# Patient Record
Sex: MALE | Race: WHITE | NOT HISPANIC OR LATINO | Employment: FULL TIME | URBAN - METROPOLITAN AREA
[De-identification: names, ages, dates, MRNs, and addresses within clinical notes are randomized per-mention and may not be internally consistent; named-entity substitution may affect disease eponyms.]

---

## 2019-09-09 ENCOUNTER — EVALUATION (OUTPATIENT)
Dept: OCCUPATIONAL THERAPY | Facility: CLINIC | Age: 29
End: 2019-09-09
Payer: COMMERCIAL

## 2019-09-09 DIAGNOSIS — S63.512D SPRAIN OF CARPAL JOINT OF LEFT WRIST, SUBSEQUENT ENCOUNTER: Primary | ICD-10-CM

## 2019-09-09 PROCEDURE — 97140 MANUAL THERAPY 1/> REGIONS: CPT

## 2019-09-09 PROCEDURE — 97165 OT EVAL LOW COMPLEX 30 MIN: CPT

## 2019-09-09 NOTE — PROGRESS NOTES
OT Evaluation     Today's date: 2019  Patient name: Nii Burt  : 1990  MRN: [id number]  Referring provider: Earl Roblero  Dx:   Encounter Diagnosis     ICD-10-CM    1  Sprain of carpal joint of left wrist, subsequent encounter S63 512D                   Assessment  Assessment details: Completed initial evaluation  See report for details  Mark Hernandez a 34 y o  male who presents with sprain of carpal joint of his left wrist  He was injured about 25 month 2nd to a snowboarding accident; however patient was unable to have surgery until 2019; however he had prior Occupational Therapy  Patient wore a cast until 9/3  No weight bearing at this time  Patient presents with well healed 4cm dorsal wrist scar  PMHx includes:  Medications: See list in chart  Patient lives with his significant other  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[employer]  Some of his interests include : snowboarding, playing [employer]   FUNCTIONAL SUMMARY:   Nii Burt is independent in basic self care skills  Patient has difficulty with lifting, cooking, cleaning and work tasks  FOTO score is 35*% with a 69% liimit in function  Nii Burt presents with:  Decreased ROM in the  left wrist,   Decreased strength in the  left in the upper extremity,    No reports of sensory problems,   Increase pain rated at 3-7/10 level   Difficulty with ADLs and work tasks  Patient would benefit from Occupational Therapy to address these impairments in order to return to His prior level of function  Understanding of Dx/Px/POC: good   Prognosis: good    Goals  Short Term Goals:   to be completed in 2-4 weeks     Improve scar mobility through scar massage, Graston techniques and /or kinesiotape ,   Increase ROM of left wwrist to WNLs, through the use of heat modalities, manual therapy with Graston techniques, PROM, joint mobilizations, AROM exercises, flexion taping and or splinting as needed ,   Increase U/E/ wrist to 5/5 and hand strength left =75% of unaffected side  Decrease pain to 0-3/10, through the use of heat/cold modalities, manual therapy, kinesiotape and exercise    Long Term Goals: To be completed in 4-6 weeks    Ability to perform lifting, carrying, cooking,cleaning tasks and work tasks with minimal to no discomfort,   Patient demonstrates good carryover of HEP,   Minimal to no pain complaints  0-2/10,   Full ROM of left wrist  Improved U/E / wrist strength to 5/5 and hand strength  left =75% of unaffected side   :      Plan  Patient would benefit from: skilled occupational therapy  Planned modality interventions: thermotherapy: hydrocollator packs and ultrasound  Planned therapy interventions: joint mobilization, manual therapy, massage, strengthening, stretching, flexibility, functional ROM exercises, home exercise program, therapeutic exercise and therapeutic activities  Frequency: 2x week  Duration in weeks: 12  Plan of Care beginning date: 2019  Plan of Care expiration date: 2019  Treatment plan discussed with: patient        Subjective Evaluation    Pain  Current pain ratin  At best pain rating: 3  At worst pain ratin    Social Support  Lives in: [place]  Lives with: significant other    Employment status: working (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 [employer])  Hand dominance: right    Patient Goals  Patient goals for therapy: increased motion, increased strength, return to sport/leisure activities, return to work and decreased pain  Patient goal: strength and mobility        Objective     Active Range of Motion     Left Wrist   Wrist flexion: 30 degrees with pain  Wrist extension: 5 degrees with pain  Radial deviation: 10 degrees with pain  Ulnar deviation: 15 degrees with pain      Right Wrist   Wrist flexion: 90 degrees   Wrist extension: 45 degrees   Radial deviation: 25 degrees   Ulnar deviation: 45 degrees     Strength/Myotome Testing     Left Wrist/Hand      (2nd hand position)     Trial 1: 30    Thumb Strength  Key/Lateral Pinch     Trail 1: 16  Tip/Two-Point Pinch     Trial 1: 10  Palmar/Three-Point Pinch     Trial 1: 10    Right Wrist/Hand      (2nd hand position)     Trial 1: 125    Thumb Strength   Key/Lateral Pinch     Trial 1: 23  Tip/Two-Point Pinch     Trial 1: 24  Palmar/Three-Point Pinch     Trial 1: 27      Flowsheet Rows      Most Recent Value   PT/OT G-Codes   Current Score  58   Projected Score  0               Subjective: Patient reports pain level as 4/10 today  Objective: Completed initial evaluation  See report for details  Completed Hot pack for warm up, manual therapy, with graston technique, exercises and activities to increase ROM, strength, coordination and function  See Treatment Diary below  Home Program:See Media Section for details  AROM wrist exercises  Precautions: Weight bearing restriction    Assessment: Patient tolerated session well  Plan: Continue Occupational Therapy ~2x/week to decrease pain and edema and improve ROM, strength and function

## 2019-09-09 NOTE — LETTER
September 10, 2019    Isha Ledezma    Patient: Arslan Mcfadden   YOB: 1990   Date of Visit: 2019     Encounter Diagnosis     ICD-10-CM    1  Sprain of carpal joint of left wrist, subsequent encounter S63 512D        Dear Dr Brett Goldmann: Thank you for your recent referral of Arslan Mcfadden  Please review the attached evaluation summary from [facility] recent visit  Please verify that you agree with the plan of care by signing the attached order  If you have any questions or concerns, please do not hesitate to call  I sincerely appreciate the opportunity to share in the care of one of your patients and hope to have another opportunity to work with you in the near future  Sincerely,    Tao Enrique, OT      Referring Provider:     I certify that I have read the below Plan of Care and certify the need for these services furnished under this plan of treatment while under my care  Isha Messerport: [phone]        OT Evaluation     Today's date: 2019  Patient name: Arslan Mcfadden  : 1990  MRN: [id number]  Referring provider: Kelly Schwab  Dx:   Encounter Diagnosis     ICD-10-CM    1  Sprain of carpal joint of left wrist, subsequent encounter S63 512D                   Assessment  Assessment details: Completed initial evaluation  See report for details  Fauzia Bojorquez a 34 y o  male who presents with sprain of carpal joint of his left wrist  He was injured about 25 month 2nd to a snowboarding accident; however patient was unable to have surgery until 2019; however he had prior Occupational Therapy  Patient wore a cast until 9/3  No weight bearing at this time  Patient presents with well healed 4cm dorsal wrist scar  PMHx includes:  Medications: See list in chart  Patient lives with his significant other  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[employer]    Some of his interests include : snowboarding, playing [employer]   FUNCTIONAL SUMMARY:   Duy Sullivan is independent in basic self care skills  Patient has difficulty with lifting, cooking, cleaning and work tasks  FOTO score is 35*% with a 69% liimit in function  Duy Sullivan presents with:  Decreased ROM in the  left wrist,   Decreased strength in the  left in the upper extremity,    No reports of sensory problems,   Increase pain rated at 3-7/10 level   Difficulty with ADLs and work tasks  Patient would benefit from Occupational Therapy to address these impairments in order to return to His prior level of function  Understanding of Dx/Px/POC: good   Prognosis: good    Goals  Short Term Goals:   to be completed in 2-4 weeks  Improve scar mobility through scar massage, Graston techniques and /or kinesiotape ,   Increase ROM of left wwrist to WNLs, through the use of heat modalities, manual therapy with Graston techniques, PROM, joint mobilizations, AROM exercises, flexion taping and or splinting as needed ,   Increase U/E/ wrist to 5/5 and hand strength left =75% of unaffected side  Decrease pain to 0-3/10, through the use of heat/cold modalities, manual therapy, kinesiotape and exercise    Long Term Goals: To be completed in 4-6 weeks    Ability to perform lifting, carrying, cooking,cleaning tasks and work tasks with minimal to no discomfort,   Patient demonstrates good carryover of HEP,   Minimal to no pain complaints  0-2/10,   Full ROM of left wrist  Improved U/E / wrist strength to 5/5 and hand strength  left =75% of unaffected side   :      Plan  Patient would benefit from: skilled occupational therapy  Planned modality interventions: thermotherapy: hydrocollator packs and ultrasound  Planned therapy interventions: joint mobilization, manual therapy, massage, strengthening, stretching, flexibility, functional ROM exercises, home exercise program, therapeutic exercise and therapeutic activities  Frequency: 2x week  Duration in weeks: 12  Plan of Care beginning date: 2019  Plan of Care expiration date: 2019  Treatment plan discussed with: patient        Subjective Evaluation    Pain  Current pain ratin  At best pain rating: 3  At worst pain ratin    Social Support  Lives in: [place]  Lives with: significant other    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[employer])  Hand dominance: right    Patient Goals  Patient goals for therapy: increased motion, increased strength, return to sport/leisure activities, return to work and decreased pain  Patient goal: strength and mobility        Objective     Active Range of Motion     Left Wrist   Wrist flexion: 30 degrees with pain  Wrist extension: 5 degrees with pain  Radial deviation: 10 degrees with pain  Ulnar deviation: 15 degrees with pain      Right Wrist   Wrist flexion: 90 degrees   Wrist extension: 45 degrees   Radial deviation: 25 degrees   Ulnar deviation: 45 degrees     Strength/Myotome Testing     Left Wrist/Hand      (2nd hand position)     Trial 1: 30    Thumb Strength  Key/Lateral Pinch     Trail 1: 16  Tip/Two-Point Pinch     Trial 1: 10  Palmar/Three-Point Pinch     Trial 1: 10    Right Wrist/Hand      (2nd hand position)     Trial 1: 125    Thumb Strength   Key/Lateral Pinch     Trial 1: 23  Tip/Two-Point Pinch     Trial 1: 24  Palmar/Three-Point Pinch     Trial 1: 27      Flowsheet Rows      Most Recent Value   PT/OT G-Codes   Current Score  58   Projected Score  0               Subjective: Patient reports pain level as 4/10 today  Objective: Completed initial evaluation  See report for details  Completed Hot pack for warm up, manual therapy, with graston technique, exercises and activities to increase ROM, strength, coordination and function  See Treatment Diary below  Home Program:See Media Section for details    AROM wrist exercises  Precautions: Weight bearing restriction    Assessment: Patient tolerated session well  Plan: Continue Occupational Therapy ~2x/week to decrease pain and edema and improve ROM, strength and function

## 2019-09-10 ENCOUNTER — TRANSCRIBE ORDERS (OUTPATIENT)
Dept: PHYSICAL THERAPY | Facility: CLINIC | Age: 29
End: 2019-09-10

## 2019-09-10 DIAGNOSIS — S63.512D SPRAIN OF CARPAL JOINT OF LEFT WRIST, SUBSEQUENT ENCOUNTER: Primary | ICD-10-CM

## 2019-09-11 ENCOUNTER — OFFICE VISIT (OUTPATIENT)
Dept: OCCUPATIONAL THERAPY | Facility: CLINIC | Age: 29
End: 2019-09-11
Payer: COMMERCIAL

## 2019-09-11 DIAGNOSIS — S63.512D SPRAIN OF CARPAL JOINT OF LEFT WRIST, SUBSEQUENT ENCOUNTER: Primary | ICD-10-CM

## 2019-09-11 PROCEDURE — 97110 THERAPEUTIC EXERCISES: CPT

## 2019-09-11 PROCEDURE — 97140 MANUAL THERAPY 1/> REGIONS: CPT

## 2019-09-11 PROCEDURE — 97530 THERAPEUTIC ACTIVITIES: CPT

## 2019-09-11 NOTE — PROGRESS NOTES
Daily Note     Today's date: 2019  Patient name: Davon Law  : 1990  MRN: [id number]  Referring provider: Jay Gilbert  Dx:   Encounter Diagnosis     ICD-10-CM    1  Sprain of carpal joint of left wrist, subsequent encounter S63 512D                   Subjective: 010      Objective: Completed Hot pack for warm up, manual therapy, exercises and activities to increase ROM, strength, coordination and function  See treatment diary below  Home Program:See Media Section for details  AROM wrist exercises     Precautions: weight bearing restriction       Manual                                                     Exercise Diary         Wrist maze 6       dice        Key pegs 2 x 21       Clothes pegs Firm 2 x 24  Easy 2 x 20       Weight well 3 cycles       powerbar Supination/pronation 2 x 15       Wrist weight 2lbs flexion/exten  UD/RD  X 12                                                                                                                    Modalities        Moist heat ~ 5 mins                          Assessment: Tolerated treatment well  Patient would benefit from continued OT      Plan: Continue per plan of care    ~2x/week to decrease pain and edema and improve ROM, strength and function

## 2019-09-16 ENCOUNTER — OFFICE VISIT (OUTPATIENT)
Dept: OCCUPATIONAL THERAPY | Facility: CLINIC | Age: 29
End: 2019-09-16
Payer: COMMERCIAL

## 2019-09-16 DIAGNOSIS — S63.512D SPRAIN OF CARPAL JOINT OF LEFT WRIST, SUBSEQUENT ENCOUNTER: Primary | ICD-10-CM

## 2019-09-16 PROCEDURE — 97110 THERAPEUTIC EXERCISES: CPT

## 2019-09-16 PROCEDURE — 97530 THERAPEUTIC ACTIVITIES: CPT

## 2019-09-16 PROCEDURE — 97140 MANUAL THERAPY 1/> REGIONS: CPT

## 2019-09-16 NOTE — PROGRESS NOTES
Daily Note     Today's date: 2019  Patient name: Vladimir Hurst  : 1990  MRN: [id number]  Referring provider: Juan Coronado  Dx:   Encounter Diagnosis     ICD-10-CM    1  Sprain of carpal joint of left wrist, subsequent encounter S63 512D                   Subjective: 3/10- I may have slept on it      Objective: Completed Hot pack for warm up, manual therapy, exercises and activities to increase ROM, strength, coordination and function  See treatment diary below  Home Program:See Media Section for details  AROM wrist exercises     Precautions: weight bearing restriction       Manual        graston  To wrist      Joint mobilization  Grade 2-3 to wrist ant/post/ul/radial                                  Exercise Diary        Wrist maze 6 6      dice        Key pegs 2 x 21       Clothes pegs Firm 2 x 24  Easy 2 x 20 Firm 2 x 24      Weight well 3 cycles 3 cycles      powerbar Supination/pronation 2 x 15 Supination/pronation 2 x 20      Wrist weight 2lbs flexion/exten  UD/RD  X 12 2lbs flexion/exten UD/RD x 15      hammer  2 x 15 sup/pron                                                                                                          Modalities       Moist heat ~ 5 mins X ~ 5 mins                         Assessment: Tolerated treatment well  Patient would benefit from continued OT- Patient did well today despite having initial pain       Plan: Continue per plan of care    ~2x/week to decrease pain and edema and improve ROM, strength and function

## 2019-09-18 ENCOUNTER — OFFICE VISIT (OUTPATIENT)
Dept: OCCUPATIONAL THERAPY | Facility: CLINIC | Age: 29
End: 2019-09-18
Payer: COMMERCIAL

## 2019-09-18 DIAGNOSIS — S63.512D SPRAIN OF CARPAL JOINT OF LEFT WRIST, SUBSEQUENT ENCOUNTER: Primary | ICD-10-CM

## 2019-09-18 PROCEDURE — 97140 MANUAL THERAPY 1/> REGIONS: CPT

## 2019-09-18 PROCEDURE — 97110 THERAPEUTIC EXERCISES: CPT

## 2019-09-18 PROCEDURE — 97530 THERAPEUTIC ACTIVITIES: CPT

## 2019-09-18 NOTE — PROGRESS NOTES
Daily Note     Today's date: 2019  Patient name: Andria Talley  : 1990  MRN: [id number]  Referring provider: Kait Miner  Dx:   Encounter Diagnosis     ICD-10-CM    1  Sprain of carpal joint of left wrist, subsequent encounter S63 512D                   Subjective: 0/10 "just achy"      Objective: Completed Hot pack for warm up, manual therapy, exercises and activities to increase ROM, strength, coordination and function  Application of kineciotape for scar management  See treatment diary below  Home Program:See Media Section for details  AROM wrist exercises     Precautions: weight bearing restriction       Manual       graston  To wrist To wrist     Joint mobilization  Grade 2-3 to wrist ant/post/ul/radial Grade 2-3 to wrist ant/post/ul/radial                                 Exercise Diary       Wrist maze 6 6 6     dice        Key pegs 2 x 21       Clothes pegs Firm 2 x 24  Easy 2 x 20 Firm 2 x 24 Firm 2 x 24     Weight well 3 cycles 3 cycles 3 cycles     powerbar Supination/pronation 2 x 15 Supination/pronation 2 x 20 Supination/pronation 2 x 20     Wrist weight 2lbs flexion/exten  UD/RD  X 12 2lbs flexion/exten UD/RD x 15 2lbs flexion/exten UD/RD x 15     hammer  2 x 15 sup/pron      putty   Button removal and jar turns     gripper   Y3 x 20                                                                                         Modalities      Moist heat ~ 5 mins X ~ 5 mins X~ 5 mins                        Assessment: Tolerated treatment well  Patient would benefit from continued OT- Patient has more movement in the wrist with no adhesion at the scar  No complaints of pain      Plan: Continue per plan of care    ~2x/week to decrease pain and edema and improve ROM, strength and function

## 2019-09-23 ENCOUNTER — OFFICE VISIT (OUTPATIENT)
Dept: OCCUPATIONAL THERAPY | Facility: CLINIC | Age: 29
End: 2019-09-23
Payer: COMMERCIAL

## 2019-09-23 DIAGNOSIS — S63.512D SPRAIN OF CARPAL JOINT OF LEFT WRIST, SUBSEQUENT ENCOUNTER: Primary | ICD-10-CM

## 2019-09-23 PROCEDURE — 97530 THERAPEUTIC ACTIVITIES: CPT

## 2019-09-23 PROCEDURE — 97140 MANUAL THERAPY 1/> REGIONS: CPT

## 2019-09-23 PROCEDURE — 97110 THERAPEUTIC EXERCISES: CPT

## 2019-09-23 NOTE — PROGRESS NOTES
Daily Note     Today's date: 2019  Patient name: Aldo Roche  : 1990  MRN: [id number]  Referring provider: John Jerome  Dx:   Encounter Diagnosis     ICD-10-CM    1  Sprain of carpal joint of left wrist, subsequent encounter S63 512D                   Subjective: 2/10 "just achy"      Objective: Completed Hot pack for warm up, manual therapy, exercises and activities to increase ROM, strength, coordination and function  Application of kineciotape for scar management  See treatment diary below  Home Program:See Media Section for details  AROM wrist exercises     Precautions: weight bearing restriction       Manual      graston  To wrist To wrist To wrist    Joint mobilization  Grade 2-3 to wrist ant/post/ul/radial Grade 2-3 to wrist ant/post/ul/radial      Grade 2-3 to wrist ant/post/ul/radial                                   Exercise Diary      Wrist maze 6 6 6 6    dice        Key pegs 2 x 21       Clothes pegs Firm 2 x 24  Easy 2 x 20 Firm 2 x 24 Firm 2 x 24 Firm 2 x 24    Weight well 3 cycles 3 cycles 3 cycles 3 cycle    powerbar Supination/pronation 2 x 15 Supination/pronation 2 x 20 Supination/pronation 2 x 20 Supination/pronation 2 x 20    Wrist weight 2lbs flexion/exten  UD/RD  X 12 2lbs flexion/exten UD/RD x 15 2lbs flexion/exten UD/RD x 15     hammer  2 x 15 sup/pron  2 x 15    putty   Button removal and jar turns     gripper   Y3 x 20 B3 x 20                                                                                        Modalities     Moist heat ~ 5 mins X ~ 5 mins X~ 5 mins X~ 5 mins                       Assessment: Tolerated treatment well  Patient would benefit from continued OT- Patient has more movement in the wrist with no adhesion at the scar  Plan: Continue per plan of care    ~2x/week to decrease pain and edema and improve ROM, strength and function

## 2019-09-25 ENCOUNTER — OFFICE VISIT (OUTPATIENT)
Dept: OCCUPATIONAL THERAPY | Facility: CLINIC | Age: 29
End: 2019-09-25
Payer: COMMERCIAL

## 2019-09-25 DIAGNOSIS — S63.512D SPRAIN OF CARPAL JOINT OF LEFT WRIST, SUBSEQUENT ENCOUNTER: Primary | ICD-10-CM

## 2019-09-25 PROCEDURE — 97140 MANUAL THERAPY 1/> REGIONS: CPT

## 2019-09-25 PROCEDURE — 97110 THERAPEUTIC EXERCISES: CPT

## 2019-09-25 PROCEDURE — 97530 THERAPEUTIC ACTIVITIES: CPT

## 2019-09-25 NOTE — PROGRESS NOTES
Daily Note     Today's date: 2019  Patient name: Leala Mcburney  : 1990  MRN: [id number]  Referring provider: Nadia Bowser  Dx:   Encounter Diagnosis     ICD-10-CM    1  Sprain of carpal joint of left wrist, subsequent encounter S63 512D                   Subjective: 2/10 "just achy"      Objective: Completed Hot pack for warm up, manual therapy, exercises and activities to increase ROM, strength, coordination and function  See treatment diary below  Home Program:See Media Section for details  AROM wrist exercises     Precautions: weight bearing        Manual     graston  To wrist To wrist To wrist To wrist   Joint mobilization  Grade 2-3 to wrist ant/post/ul/radial Grade 2-3 to wrist ant/post/ul/radial      Grade 2-3 to wrist ant/post/ul/radial    Grade 2-3 to wrist ant/post/ul/radial                                  Exercise Diary     Wrist maze 6 6 6 6 6   dice        Key pegs 2 x 21    2 x 21   Clothes pegs Firm 2 x 24  Easy 2 x 20 Firm 2 x 24 Firm 2 x 24 Firm 2 x 24 Firm 2 x 24   Weight well 3 cycles 3 cycles 3 cycles 3 cycle 4 cycles   powerbar Supination/pronation 2 x 15 Supination/pronation 2 x 20 Supination/pronation 2 x 20 Supination/pronation 2 x 20    Wrist weight 2lbs flexion/exten  UD/RD  X 12 2lbs flexion/exten UD/RD x 15 2lbs flexion/exten UD/RD x 15  3lbs flexion/exten UD/RD x 20   hammer  2 x 15 sup/pron  2 x 15    putty   Button removal and jar turns  Button removal/jar/key turns   gripper   Y3 x 20 B3 x 20    Interossei strengthening     Tan ball 2 x 10   Weight pulley     Triceps #3 x 15  IR # 3 x 15  ER #3 x 15  Prot #3 x 15  Biceps #3 x 15                                                                         Modalities    Moist heat ~ 5 mins X ~ 5 mins X~ 5 mins X~ 5 mins X~ 5 mins                      Assessment: Tolerated treatment well   Patient would benefit from continued OT- Patient has more movement in the wrist with no adhesion at the scar  Plan: Continue per plan of care    ~2x/week to decrease pain and edema and improve ROM, strength and function

## 2019-09-30 ENCOUNTER — OFFICE VISIT (OUTPATIENT)
Dept: OCCUPATIONAL THERAPY | Facility: CLINIC | Age: 29
End: 2019-09-30
Payer: COMMERCIAL

## 2019-09-30 DIAGNOSIS — S63.512D SPRAIN OF CARPAL JOINT OF LEFT WRIST, SUBSEQUENT ENCOUNTER: Primary | ICD-10-CM

## 2019-09-30 PROCEDURE — 97530 THERAPEUTIC ACTIVITIES: CPT

## 2019-09-30 PROCEDURE — 97110 THERAPEUTIC EXERCISES: CPT

## 2019-09-30 PROCEDURE — 97140 MANUAL THERAPY 1/> REGIONS: CPT

## 2019-09-30 NOTE — PROGRESS NOTES
Daily Note     Today's date: 2019  Patient name: Brandi Joy  : 1990  MRN: [id number]  Referring provider: David Mauro  Dx:   Encounter Diagnosis     ICD-10-CM    1  Sprain of carpal joint of left wrist, subsequent encounter S63 512D                   Subjective: 2/10 "just achy"      Objective: Completed Hot pack for warm up, manual therapy, exercises and activities to increase ROM, strength, coordination and function  See treatment diary below  Home Program:See Media Section for details    AROM wrist exercises     Precautions: weight bearing        Manual     graston  To wrist To wrist To wrist To wrist   Joint mobilization  Grade 2-3 to wrist ant/post/ul/radial Grade 2-3 to wrist ant/post/ul/radial      Grade 2-3 to wrist ant/post/ul/radial    Grade 2-3 to wrist ant/post/ul/radial                                  Exercise Diary     Wrist maze 6 6 6 6 6   dice        Key pegs 2 x 21    2 x 21   Clothes pegs Firm 2 x 24   Firm 2 x 24 Firm 2 x 24 Firm 2 x 24 Firm 2 x 24   Weight well 5 cycles 3 cycles 3 cycles 3 cycle 4 cycles   powerbar Supination/pronation 2 x 15 Supination/pronation 2 x 20 Supination/pronation 2 x 20 Supination/pronation 2 x 20    Wrist weight 3lbs flexion/exten  UD/RD  X 20 2lbs flexion/exten UD/RD x 15 2lbs flexion/exten UD/RD x 15  3lbs flexion/exten UD/RD x 20   hammer 2 x 20  2 x 15 sup/pron  2 x 15    putty Button removal/jar and key turns  Button removal and jar turns  [employer] removal/jar/key turns   gripper B3 x 20  Y3 x 20 B3 x 20    Interossei strengthening     Tan ball 2 x 10   Weight pulley Triceps #3 x 15  IR # 3 x 15  ER #3 x 15  Prot #3 x 15  Biceps #3 x 15    Triceps #3 x 15  IR # 3 x 15  ER #3 x 15  Prot #3 x 15  Biceps #3 x 15                                                                         Modalities    Moist heat ~ 5 mins X ~ 5 mins X~ 5 mins X~ 5 mins X~ 5 mins Assessment: Tolerated treatment well  Patient would benefit from continued OT- Patient has more movement in the wrist with no adhesion at the scar  Plan: Continue per plan of care    ~2x/week to decrease pain and edema and improve ROM, strength and function

## 2019-10-02 ENCOUNTER — APPOINTMENT (OUTPATIENT)
Dept: OCCUPATIONAL THERAPY | Facility: CLINIC | Age: 29
End: 2019-10-02
Payer: COMMERCIAL

## 2019-10-03 ENCOUNTER — EVALUATION (OUTPATIENT)
Dept: OCCUPATIONAL THERAPY | Facility: CLINIC | Age: 29
End: 2019-10-03
Payer: COMMERCIAL

## 2019-10-03 DIAGNOSIS — S63.512D SPRAIN OF CARPAL JOINT OF LEFT WRIST, SUBSEQUENT ENCOUNTER: Primary | ICD-10-CM

## 2019-10-03 PROCEDURE — 97140 MANUAL THERAPY 1/> REGIONS: CPT

## 2019-10-03 PROCEDURE — 97530 THERAPEUTIC ACTIVITIES: CPT

## 2019-10-03 PROCEDURE — 97110 THERAPEUTIC EXERCISES: CPT

## 2019-10-03 NOTE — PROGRESS NOTES
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[employer])  Hand dominance: right    Patient Goals  Patient goals for therapy: increased motion, increased strength, return to sport/leisure activities, return to work and decreased pain  Patient goal: strength and mobility        Objective     Active Range of Motion     Left Wrist   Wrist flexion: 55 degrees with pain  Wrist extension: 32 degrees with pain  Radial deviation: 20 degrees with pain  Ulnar deviation: 35 degrees with pain      Right Wrist   Wrist flexion: 90 degrees   Wrist extension: 45 degrees   Radial deviation: 25 degrees   Ulnar deviation: 45 degrees     Strength/Myotome Testing     Left Wrist/Hand      (2nd hand position)     Trial 1: 70    Thumb Strength  Key/Lateral Pinch     Trail 1: 17  Tip/Two-Point Pinch     Trial 1: 12  Palmar/Three-Point Pinch     Trial 1: 17    Right Wrist/Hand      (2nd hand position)     Trial 1: 125    Thumb Strength   Key/Lateral Pinch     Trial 1: 23  Tip/Two-Point Pinch     Trial 1: 24  Palmar/Three-Point Pinch     Trial 1: 27      ASSESSMENT AND PLAN    Junaid Reese a 34 y o  male who presents with sprain of carpal joint of his left wrist  He was injured about 25 month 2nd to a snowboarding accident; however patient was unable to have surgery until 2019; however he had prior Occupational Therapy  Patient wore a cast until 9/3  No weight bearing at this time   Patient presents with well healed 4cm dorsal wrist scar  PMHx includes:  Medications: See list in chart  Patient lives with his significant other  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[employer]  Some of his interests include : snowboarding, playing [employer]   ReAssurz Summary  Nancyann Nissen was initially seen on 9/9 and has since been seen ~2x/week  Treatment has included moist heat, manual therapy with myofascial soft tissue work and Graston techniques, Joint mobs and PROM, therapeutic exercises and kinesiotape  Nancyann Nissen has shown good improvement in ROM and strength  Pain level is decreasing  He is doing more ADLs at home  Doing his yard work with less discomfort and also playing his guitar  He could benefit from a few more weeks of therapy to further improve ROM, strength and function  FUNCTIONAL SUMMARY:   Nancyann Nissen is independent in basic self care skills  Patient has difficulty with lifting, cooking, cleaning and work tasks  FOTO score is 56% with a 44% liimit in function  Nancyann Nissen presents with:  Decreased ROM in the  left wrist,   Decreased strength in the  left in the upper extremity,    No reports of sensory problems,   Increase pain rated at 0-4/10 level   Difficulty with ADLs and work tasks  Patient would benefit from Occupational Therapy to address these impairments in order to return to His prior level of function  Understanding of Dx/Px/POC: good   Prognosis: good    Goals  Short Term Goals:   to be completed in 2-4 weeks  Improve scar mobility through scar massage, Graston techniques and /or kinesiotape  , -MET  Increase ROM of left wwrist to WNLs, through the use of heat modalities, manual therapy with Graston techniques, PROM, joint mobilizations, AROM exercises, flexion taping and or splinting as needed  , -PARTIALLY MET  Increase U/E/ wrist to 5/5 and hand strength left =75% of unaffected side   -PARTIALLY MET  Decrease pain to 0-3/10, through the use of heat/cold modalities, manual therapy, kinesiotape and exercise -PARTIALLY MET    Long Term Goals: To be completed in 4-6 weeks  Ability to perform lifting, carrying, cooking,cleaning tasks and work tasks with minimal to no discomfort-PARTIALLY MET   Patient demonstrates good carryover of HEP-MET  Minimal to no pain complaints  0-2/10- PARTIALLY MET  Full ROM of left digits-PARTIALLY MET  Improved U/E / wrist strength to 5/5 and hand strength  left =75% of unaffected side  -PARTIALLY MET      Subjective: 0/10 "just achy"      Objective: Completed Hot pack for warm up, manual therapy, exercises and activities to increase ROM, strength, coordination and function  See treatment diary below  Home Program:See Media Section for details    AROM wrist exercises     Precautions: weight bearing        Manual  9/30 10/3 9/18 9/23 9/25   graston  To wrist To wrist To wrist To wrist   Joint mobilization  Grade 2-3 to wrist ant/post/ul/radial Grade 2-3 to wrist ant/post/ul/radial      Grade 2-3 to wrist ant/post/ul/radial    Grade 2-3 to wrist ant/post/ul/radial                                  Exercise Diary  9/30 10/3 9/18 9/23 9/25   Wrist maze 6 6 6 6 6   dice        Key pegs 2 x 21    2 x 21   Clothes pegs Firm 2 x 24   Firm 2 x 24 Firm 2 x 24 Firm 2 x 24 Firm 2 x 24   Weight well 5 cycles 4 cycles 3 cycles 3 cycle 4 cycles   powerbar Supination/pronation 2 x 15 Supination/pronation 2 x 20 Supination/pronation 2 x 20 Supination/pronation 2 x 20    Wrist weight 3lbs flexion/exten  UD/RD  X 20 3lbs flexion/exten UD/RD x 20 2lbs flexion/exten UD/RD x 15  3lbs flexion/exten UD/RD x 20   hammer 2 x 20    2 x 15    putty Button removal/jar and key turns Button removal/jar and key turns Button removal and jar turns  [employer] removal/jar/key turns   gripper B3 x 20  Y3 x 20 B3 x 20    Interossei strengthening     Tan ball 2 x 10   Weight pulley Triceps #3 x 15  IR # 3 x 15  ER #3 x 15  Prot #3 x 15  Biceps #3 x 15 Triceps #3 x 15  IR # 3 x 15  ER #3 x 15  Prot #3 x 15  Biceps #3 x 15   Triceps #3 x 15  IR # 3 x 15  ER #3 x 15  Prot #3 x 15  Biceps #3 x 15                                                                         Modalities 9/30 10/3 9/18 9/23 9/25   Moist heat ~ 5 mins X ~ 5 mins X~ 5 mins X~ 5 mins X~ 5 mins                      Assessment: Tolerated treatment well  Patient would benefit from continued OT- Patient has more movement in the wrist with some tightness in extension  Scar is well healed with no adhesion  Plan: Continue per plan of care    ~2x/week to decrease pain and edema and improve ROM, strength and function

## 2019-10-07 ENCOUNTER — OFFICE VISIT (OUTPATIENT)
Dept: OCCUPATIONAL THERAPY | Facility: CLINIC | Age: 29
End: 2019-10-07
Payer: COMMERCIAL

## 2019-10-07 DIAGNOSIS — S63.512D SPRAIN OF CARPAL JOINT OF LEFT WRIST, SUBSEQUENT ENCOUNTER: Primary | ICD-10-CM

## 2019-10-07 PROCEDURE — 97140 MANUAL THERAPY 1/> REGIONS: CPT | Performed by: OCCUPATIONAL THERAPIST

## 2019-10-07 NOTE — PROGRESS NOTES
Daily Note     Today's date: 10/7/2019  Patient name: Any Gates  : 1990  MRN: [id number]  Referring provider: Chayo Howard  Dx:   Encounter Diagnosis     ICD-10-CM    1  Sprain of carpal joint of left wrist, subsequent encounter S63 512D        Start Time: 0750  Stop Time: 0830  Total time in clinic (min): 40 minutes    Subjective: 0/10 pain      Objective: Completed Hot pack for warm up, manual therapy, exercises and activities to increase ROM, strength, coordination and function  See treatment diary below    Home Program:See Media Section for details    AROM wrist exercises     Precautions: weight bearing        Manual  9/30 10/3 10/7 9/23 9/25   graston  To wrist To wrist To wrist To wrist   Joint mobilization  Grade 2-3 to wrist ant/post/ul/radial Grade 2-3 to wrist ant/post/ul/radial      Grade 2-3 to wrist ant/post/ul/radial    Grade 2-3 to wrist ant/post/ul/radial                                  Exercise Diary  9/30 10/3 10/7 9/23 9/25   Wrist maze 6 6 6 6 6   dice        Key pegs 2 x 21    2 x 21   Clothes pegs Firm 2 x 24   Firm 2 x 24 Firm 2 x 24 Firm 2 x 24 Firm 2 x 24   Weight well 5 cycles 4 cycles  3 cycle 4 cycles   powerbar Supination/pronation 2 x 15 Supination/pronation 2 x 20 Supination/pronation 2 x 20 Supination/pronation 2 x 20    Wrist weight 3lbs flexion/exten  UD/RD  X 20 3lbs flexion/exten UD/RD x 20 4lbs flexion/exten UD/RD x 15  3lbs flexion/exten UD/RD x 20   hammer 2 x 20    2 x 15    putty Button removal/jar and key turns Button removal/jar and key turns Button removal and jar turns  [employer] removal/jar/key turns   gripper B3 x 20  B4 x 20 B3 x 20    Interossei strengthening   Rubber Bands Red x10, Yellow x10, Tan x10  Tan ball 2 x 10   Weight pulley Triceps #3 x 15  IR # 3 x 15  ER #3 x 15  Prot #3 x 15  Biceps #3 x 15 Triceps #3 x 15  IR # 3 x 15  ER #3 x 15  Prot #3 x 15  Biceps #3 x 15   Triceps #3 x 15  IR # 3 x 15  ER #3 x 15  Prot #3 x 15  Biceps #3 x 15 Modalities 9/30 10/3 10/7 9/23 9/25   Moist heat ~ 5 mins X ~ 5 mins X~ 5 mins X~ 5 mins X~ 5 mins                      Assessment: Tolerated treatment well  Patient would benefit from continued OT for continued strengthening in preparation for patient's return to work as a   Plan: Continue per plan of care    ~2x/week to decrease pain and edema and improve ROM, strength and function

## 2019-10-09 ENCOUNTER — OFFICE VISIT (OUTPATIENT)
Dept: OCCUPATIONAL THERAPY | Facility: CLINIC | Age: 29
End: 2019-10-09
Payer: COMMERCIAL

## 2019-10-09 DIAGNOSIS — S63.512D SPRAIN OF CARPAL JOINT OF LEFT WRIST, SUBSEQUENT ENCOUNTER: Primary | ICD-10-CM

## 2019-10-09 PROCEDURE — 97530 THERAPEUTIC ACTIVITIES: CPT

## 2019-10-09 PROCEDURE — 97110 THERAPEUTIC EXERCISES: CPT

## 2019-10-09 PROCEDURE — 97140 MANUAL THERAPY 1/> REGIONS: CPT

## 2019-10-09 NOTE — PROGRESS NOTES
Daily Note     Today's date: 10/9/2019  Patient name: Sue Nicholas  : 1990  MRN: [id number]  Referring provider: Darlyn Caceres  Dx:   Encounter Diagnosis     ICD-10-CM    1  Sprain of carpal joint of left wrist, subsequent encounter S63 512D                   Subjective: 0/10 pain      Objective: Completed Hot pack for warm up, manual therapy, exercises and activities to increase ROM, strength, coordination and function  See treatment diary below  Patient  Saw MD on 10/4 - restriction lifted    Home Program:See Media Section for details    AROM wrist exercises  Putty - instruct to weight bear for extension  Precautions:       Manual  9/30 10/3 10/7 10/9 9/25   graston  To wrist To wrist To wrist To wrist   Joint mobilization  Grade 2-3 to wrist ant/post/ul/radial Grade 2-3 to wrist ant/post/ul/radial      Grade 2-3 to wrist ant/post/ul/radial    Grade 2-3 to wrist ant/post/ul/radial                                  Exercise Diary  9/30 10/3 10/7 10/9 9/25   Wrist maze 6 6 6 6 6   dice        Key pegs 2 x 21    2 x 21   Clothes pegs Firm 2 x 24   Firm 2 x 24 Firm 2 x 24 Firm 2 x 24 Firm 2 x 24   Weight well 5 cycles 4 cycles  4 cycle 4 cycles   powerbar Supination/pronation 2 x 15 Supination/pronation 2 x 20 Supination/pronation 2 x 20 Supination/pronation 2 x 20    Wrist weight 3lbs flexion/exten  UD/RD  X 20 3lbs flexion/exten UD/RD x 20 4lbs flexion/exten UD/RD x 15 4lbs flexion/exten UD/RD x 15 3lbs flexion/exten UD/RD x 20   hammer 2 x 20        putty Button removal/jar and key turns Button removal/jar and key turns Button removal and jar turns Button removal and jar turns [employer] removal/jar/key turns   gripper B3 x 20  B4 x 20 B4 x 20    Interossei strengthening   Rubber Bands Red x10, Yellow x10, Tan x10 Rubber Bands Red x10, Yellow x10, Tan x10 Tan ball 2 x 10   Weight pulley Triceps #3 x 15  IR # 3 x 15  ER #3 x 15  Prot #3 x 15  Biceps #3 x 15 Triceps #3 x 15  IR # 3 x 15  ER #3 x 15  Prot #3 x 15  Biceps #3 x 15  Triceps #5 x 15  IR # 5 x 15  ER #5 x 15  Prot #5 x 15  Biceps #3 x 15 Triceps #3 x 15  IR # 3 x 15  ER #3 x 15  Prot #3 x 15  Biceps #3 x 15                                                                         Modalities 9/30 10/3 10/7 10/9 9/25   Moist heat ~ 5 mins X ~ 5 mins X~ 5 mins X~ 5 mins X~ 5 mins                      Assessment: Tolerated treatment well  Patient would benefit from continued OT for continued strengthening in preparation for patient's return to work as a   Given putty exercises doing well with increased activities  Plan: Continue per plan of care    ~2x/week to decrease pain and edema and improve ROM, strength and function

## 2019-10-14 ENCOUNTER — OFFICE VISIT (OUTPATIENT)
Dept: OCCUPATIONAL THERAPY | Facility: CLINIC | Age: 29
End: 2019-10-14
Payer: COMMERCIAL

## 2019-10-14 DIAGNOSIS — S63.512D SPRAIN OF CARPAL JOINT OF LEFT WRIST, SUBSEQUENT ENCOUNTER: Primary | ICD-10-CM

## 2019-10-14 PROCEDURE — 97110 THERAPEUTIC EXERCISES: CPT

## 2019-10-14 PROCEDURE — 97140 MANUAL THERAPY 1/> REGIONS: CPT

## 2019-10-14 PROCEDURE — 97530 THERAPEUTIC ACTIVITIES: CPT

## 2019-10-14 NOTE — PROGRESS NOTES
Daily Note     Today's date: 10/14/2019  Patient name: Alicia Infante  : 1990  MRN: [id number]  Referring provider: Elvira Barlow  Dx:   Encounter Diagnosis     ICD-10-CM    1  Sprain of carpal joint of left wrist, subsequent encounter S63 512D                   Subjective: 0/10 pain      Objective: Completed Hot pack for warm up, manual therapy, exercises and activities to increase ROM, strength, coordination and function  See treatment diary below  Patient  Saw MD on 10/4 - restriction lifted    Home Program:See Media Section for details    AROM wrist exercises  Putty - instruct to weight bear for extension  Precautions:       Manual  9/30 10/3 10/7 10/9 10/14   graston  To wrist To wrist To wrist To wrist   Joint mobilization  Grade 2-3 to wrist ant/post/ul/radial Grade 2-3 to wrist ant/post/ul/radial      Grade 2-3 to wrist ant/post/ul/radial    Grade 2-3 to wrist ant/post/ul/radial                                  Exercise Diary  9/30 10/3 10/7 10/9 10/14   Wrist maze 6 6 6 6 6   dice        Key pegs 2 x 21    2 x 21   Clothes pegs Firm 2 x 24   Firm 2 x 24 Firm 2 x 24 Firm 2 x 24 Firm 2 x 24   Weight well 5 cycles 4 cycles  4 cycle 4 cycles   powerbar Supination/pronation 2 x 15 Supination/pronation 2 x 20 Supination/pronation 2 x 20 Supination/pronation 2 x 20    Wrist weight 3lbs flexion/exten  UD/RD  X 20 3lbs flexion/exten UD/RD x 20 4lbs flexion/exten UD/RD x 15 4lbs flexion/exten UD/RD x 15 4lbs flexion/exten UD/RD x 20   hammer 2 x 20        putty Button removal/jar and key turns Button removal/jar and key turns Button removal and jar turns Button removal and jar turns [employer] removal/jar/key turns   gripper B3 x 20  B4 x 20 B4 x 20    Interossei strengthening   Rubber Bands Red x10, Yellow x10, Tan x10 Rubber Bands Red x10, Yellow x10, Tan x10 Rubber Bands Red x10, Yellow x10, Tan x10   Weight pulley Triceps #3 x 15  IR # 3 x 15  ER #3 x 15  Prot #3 x 15  Biceps #3 x 15 Triceps #3 x 15  IR # 3 x 15  ER #3 x 15  Prot #3 x 15  Biceps #3 x 15  Triceps #5 x 15  IR # 5 x 15  ER #5 x 15  Prot #5 x 15  Biceps #3 x 15 Triceps #5 x 15  IR # 3 x 15  ER #3 x 15  Prot #3 x 15  Biceps #3 x 15                                                                         Modalities 9/30 10/3 10/7 10/9 10/14   Moist heat ~ 5 mins X ~ 5 mins X~ 5 mins X~ 5 mins X~ 5 mins                      Assessment: Tolerated treatment well  Patient would benefit from continued OT for continued strengthening in preparation for patient's return to work as a   To be discharged at next session    Plan: Continue per plan of care    ~2x/week to decrease pain and edema and improve ROM, strength and function      Patient to be discharged at last session; however he was a no show

## 2019-12-12 ENCOUNTER — OFFICE VISIT (OUTPATIENT)
Dept: URGENT CARE | Facility: CLINIC | Age: 29
End: 2019-12-12
Payer: COMMERCIAL

## 2019-12-12 VITALS
RESPIRATION RATE: 18 BRPM | HEART RATE: 72 BPM | BODY MASS INDEX: 22.02 KG/M2 | SYSTOLIC BLOOD PRESSURE: 124 MMHG | DIASTOLIC BLOOD PRESSURE: 70 MMHG | HEIGHT: 66 IN | TEMPERATURE: 98 F | WEIGHT: 137 LBS | OXYGEN SATURATION: 100 %

## 2019-12-12 DIAGNOSIS — J06.9 VIRAL URI: Primary | ICD-10-CM

## 2019-12-12 PROCEDURE — 99203 OFFICE O/P NEW LOW 30 MIN: CPT | Performed by: PHYSICIAN ASSISTANT

## 2019-12-12 RX ORDER — BENZONATATE 200 MG/1
200 CAPSULE ORAL 3 TIMES DAILY PRN
Qty: 20 CAPSULE | Refills: 0 | Status: SHIPPED | OUTPATIENT
Start: 2019-12-12

## 2019-12-12 RX ORDER — FLUTICASONE PROPIONATE 50 MCG
2 SPRAY, SUSPENSION (ML) NASAL DAILY
Qty: 16 G | Refills: 0 | Status: SHIPPED | OUTPATIENT
Start: 2019-12-12

## 2019-12-12 NOTE — PATIENT INSTRUCTIONS

## 2019-12-12 NOTE — PROGRESS NOTES
[facility]        NAME: Dorota Gentile is a 34 y o  male  : 1990    MRN: [id number]  DATE: 2019  TIME: 5:22 PM    Assessment and Plan   Viral URI [J06 9]  1  Viral URI  fluticasone (FLONASE) 50 mcg/act nasal spray    benzonatate (TESSALON) 200 MG capsule         Patient Instructions     Discussed condition with pt  I suspect viral URI for which I will prescribe him Flonase and Tessalon Perles and rec hydration, rest, discussed OTC cold meds, and observation  Follow up with PCP in 3-5 days  Proceed to  ER if symptoms worsen  Chief Complaint     Chief Complaint   Patient presents with   Yue Kand Like Symptoms     Started Monday with congested, non-productive cough, nasal congestion/rhinorrhea with PND  No chills, ear pain, N/V or diarrhea  No OTC meds  History of Present Illness       Pt presents with onset 3 days ago of ST, slight dry cough, nasal congestion  Denies fever, chills, ear pain, HA, myalgias, N/V/D  He has not taken anything for the symptoms  Denies asthma/allergies  Does not smoke or vape  He has not yet had the flu shot  Review of Systems   Review of Systems   Constitutional: Negative  HENT: Positive for congestion and sore throat  Negative for ear pain  Respiratory: Positive for cough  Cardiovascular: Negative  Gastrointestinal: Negative  Genitourinary: Negative  Musculoskeletal: Negative for myalgias           Current Medications       Current Outpatient Medications:     benzonatate (TESSALON) 200 MG capsule, Take 1 capsule (200 mg total) by mouth 3 (three) times a day as needed for cough, Disp: 20 capsule, Rfl: 0    fluticasone (FLONASE) 50 mcg/act nasal spray, 2 sprays into each nostril daily, Disp: 16 g, Rfl: 0    Current Allergies     Allergies as of 2019    (No Known Allergies)            The following portions of the patient's history were reviewed and updated as appropriate: allergies, current medications, past family history, past medical history, past social history, past surgical history and problem list      History reviewed  No pertinent past medical history  History reviewed  No pertinent surgical history  History reviewed  No pertinent family history  Medications have been verified  Objective   /70 (BP Location: Left arm, Patient Position: Sitting, Cuff Size: Standard)   Pulse 72   Temp 98 °F (36 7 °C) (Tympanic)   Resp 18   Ht 5' 6" (1 676 m)   Wt 62 1 kg (137 lb)   SpO2 100%   BMI 22 11 kg/m²        Physical Exam     Physical Exam   Constitutional: He is oriented to person, place, and time  He appears well-developed and well-nourished  No distress  HENT:   Right Ear: Hearing, tympanic membrane, external ear and ear canal normal    Left Ear: Hearing, tympanic membrane, external ear and ear canal normal    Nose: Mucosal edema (B/L boggy turbinates) present  Mouth/Throat: Mucous membranes are normal  Posterior oropharyngeal erythema (PND) present  No oropharyngeal exudate  Neck: Neck supple  Cardiovascular: Normal rate, regular rhythm and normal heart sounds  Pulmonary/Chest: Effort normal and breath sounds normal    Lymphadenopathy:     He has no cervical adenopathy  Neurological: He is alert and oriented to person, place, and time  Psychiatric: He has a normal mood and affect  Vitals reviewed

## 2021-11-03 ENCOUNTER — OFFICE VISIT (OUTPATIENT)
Dept: URGENT CARE | Facility: CLINIC | Age: 31
End: 2021-11-03
Payer: COMMERCIAL

## 2021-11-03 VITALS
DIASTOLIC BLOOD PRESSURE: 60 MMHG | WEIGHT: 142 LBS | BODY MASS INDEX: 22.82 KG/M2 | RESPIRATION RATE: 16 BRPM | OXYGEN SATURATION: 99 % | HEART RATE: 73 BPM | HEIGHT: 66 IN | TEMPERATURE: 98.2 F | SYSTOLIC BLOOD PRESSURE: 140 MMHG

## 2021-11-03 DIAGNOSIS — J02.9 SORE THROAT: Primary | ICD-10-CM

## 2021-11-03 LAB — S PYO AG THROAT QL: NEGATIVE

## 2021-11-03 PROCEDURE — 87880 STREP A ASSAY W/OPTIC: CPT | Performed by: PHYSICIAN ASSISTANT

## 2021-11-03 PROCEDURE — 87070 CULTURE OTHR SPECIMN AEROBIC: CPT | Performed by: PHYSICIAN ASSISTANT

## 2021-11-03 PROCEDURE — 99213 OFFICE O/P EST LOW 20 MIN: CPT | Performed by: PHYSICIAN ASSISTANT

## 2021-11-05 LAB — BACTERIA THROAT CULT: NORMAL

## 2022-03-01 ENCOUNTER — OFFICE VISIT (OUTPATIENT)
Dept: URGENT CARE | Facility: CLINIC | Age: 32
End: 2022-03-01
Payer: COMMERCIAL

## 2022-03-01 VITALS — HEART RATE: 97 BPM | OXYGEN SATURATION: 98 % | TEMPERATURE: 99 F

## 2022-03-01 DIAGNOSIS — Z11.59 SPECIAL SCREENING EXAMINATION FOR VIRAL DISEASE: Primary | ICD-10-CM

## 2022-03-01 PROCEDURE — U0005 INFEC AGEN DETEC AMPLI PROBE: HCPCS | Performed by: FAMILY MEDICINE

## 2022-03-01 PROCEDURE — U0003 INFECTIOUS AGENT DETECTION BY NUCLEIC ACID (DNA OR RNA); SEVERE ACUTE RESPIRATORY SYNDROME CORONAVIRUS 2 (SARS-COV-2) (CORONAVIRUS DISEASE [COVID-19]), AMPLIFIED PROBE TECHNIQUE, MAKING USE OF HIGH THROUGHPUT TECHNOLOGIES AS DESCRIBED BY CMS-2020-01-R: HCPCS | Performed by: FAMILY MEDICINE

## 2022-03-01 PROCEDURE — 99213 OFFICE O/P EST LOW 20 MIN: CPT | Performed by: FAMILY MEDICINE

## 2022-03-01 RX ORDER — FLUTICASONE PROPIONATE 50 MCG
1 SPRAY, SUSPENSION (ML) NASAL 2 TIMES DAILY
Qty: 16 G | Refills: 0 | Status: SHIPPED | OUTPATIENT
Start: 2022-03-01

## 2022-03-01 RX ORDER — OSELTAMIVIR PHOSPHATE 75 MG/1
75 CAPSULE ORAL EVERY 12 HOURS SCHEDULED
Qty: 10 CAPSULE | Refills: 0 | Status: SHIPPED | OUTPATIENT
Start: 2022-03-01 | End: 2022-03-06

## 2022-03-01 NOTE — LETTER
March 1, 2022     Patient: Charito Cherry   YOB: 1990   Date of Visit: 3/1/2022       To Whom It May Concern:    Charito Cherry was evaluated in my office on 03/01/2022  Please excuse his absence  Was tested for COVID-19 and instructed to self quarantine until his result is received  If you have any questions or concerns, please don't hesitate to call           Sincerely,        Genaro Holland MD

## 2022-03-01 NOTE — PROGRESS NOTES
[facility]        NAME: Taffy Cockayne is a 32 y o  male  : 1990    MRN: [id number]  DATE: 2022  TIME: 8:53 AM    Assessment and Plan   Special screening examination for viral disease [Z11 59]  1  Special screening examination for viral disease  COVID Only -Office Collect    oseltamivir (TAMIFLU) 75 mg capsule    fluticasone (FLONASE) 50 mcg/act nasal spray    phenol (CHLORASEPTIC) 1 4 % mucosal liquid     Tested for COVID-19 and instructed to self quarantine until his result is received  Advised on supportive measures including drinking plenty fluids, Flonase to counteract postnasal drip and throat spray for sore throat  Tamiflu empirically prescribed for influenza  Patient Instructions     Follow up with PCP in 3-5 days  Proceed to  ER if symptoms worsen  Chief Complaint     Chief Complaint   Patient presents with    Fever     cough, fever 102, chest pressure began   Not vaccinated         History of Present Illness       49-year-old male presents today due to 2 days of flu-like symptoms including fever, chills, coughing, sore throat, nasal congestion, rhinorrhea and headaches  Also has a burning chest pain with coughing  Denies any obvious sick contacts  Has been taking Tylenol which is provided transient relief    Fever  Associated symptoms include chest pain, chills, congestion, coughing, a fever, headaches and a sore throat  Pertinent negatives include no abdominal pain or nausea  Review of Systems   Review of Systems   Constitutional: Positive for chills and fever  Negative for appetite change  HENT: Positive for congestion, rhinorrhea and sore throat  Respiratory: Positive for cough  Negative for shortness of breath and wheezing  Cardiovascular: Positive for chest pain  Gastrointestinal: Negative for abdominal pain and nausea  Neurological: Positive for headaches  Negative for dizziness         Current Medications       Current Outpatient Medications:     benzonatate (TESSALON) 200 MG capsule, Take 1 capsule (200 mg total) by mouth 3 (three) times a day as needed for cough (Patient not taking: Reported on 11/22/2021 ), Disp: 20 capsule, Rfl: 0    fluticasone (FLONASE) 50 mcg/act nasal spray, 2 sprays into each nostril daily (Patient not taking: Reported on 11/22/2021 ), Disp: 16 g, Rfl: 0    fluticasone (FLONASE) 50 mcg/act nasal spray, 1 spray into each nostril 2 (two) times a day, Disp: 16 g, Rfl: 0    oseltamivir (TAMIFLU) 75 mg capsule, Take 1 capsule (75 mg total) by mouth every 12 (twelve) hours for 5 days, Disp: 10 capsule, Rfl: 0    phenol (CHLORASEPTIC) 1 4 % mucosal liquid, Apply 1 spray to the mouth or throat every 2 (two) hours as needed (Sore throat), Disp: 20 mL, Rfl: 0    Current Allergies     Allergies as of 03/01/2022    (No Known Allergies)            The following portions of the patient's history were reviewed and updated as appropriate: allergies, current medications, past family history, past medical history, past social history, past surgical history and problem list      History reviewed  No pertinent past medical history  History reviewed  No pertinent surgical history  History reviewed  No pertinent family history  Medications have been verified  Objective   Pulse 97   Temp 99 °F (37 2 °C)   SpO2 98%   No LMP for male patient  Physical Exam     Physical Exam  Vitals and nursing note reviewed  Constitutional:       General: He is in acute distress (Sniffles; sore throat; cough)  Appearance: Normal appearance  He is normal weight  He is not ill-appearing, toxic-appearing or diaphoretic  HENT:      Head: Normocephalic and atraumatic  Nose: Rhinorrhea present  Comments: Mildly inflamed nasal mucosa; rhinorrhea     Mouth/Throat:      Mouth: Mucous membranes are moist       Pharynx: No posterior oropharyngeal erythema  Eyes:      General:         Right eye: No discharge  Left eye: No discharge  Conjunctiva/sclera: Conjunctivae normal    Cardiovascular:      Rate and Rhythm: Normal rate and regular rhythm  Pulmonary:      Effort: Pulmonary effort is normal  No respiratory distress  Breath sounds: Normal breath sounds  No wheezing, rhonchi or rales  Skin:     General: Skin is warm  Findings: No erythema  Neurological:      General: No focal deficit present  Mental Status: He is alert and oriented to person, place, and time  Psychiatric:         Mood and Affect: Mood normal          Behavior: Behavior normal          Thought Content:  Thought content normal          Judgment: Judgment normal

## 2022-03-02 LAB — SARS-COV-2 RNA RESP QL NAA+PROBE: POSITIVE

## 2022-08-13 ENCOUNTER — OFFICE VISIT (OUTPATIENT)
Dept: URGENT CARE | Facility: CLINIC | Age: 32
End: 2022-08-13
Payer: COMMERCIAL

## 2022-08-13 VITALS
HEART RATE: 81 BPM | SYSTOLIC BLOOD PRESSURE: 129 MMHG | OXYGEN SATURATION: 99 % | HEIGHT: 66 IN | DIASTOLIC BLOOD PRESSURE: 93 MMHG | TEMPERATURE: 97.5 F | RESPIRATION RATE: 18 BRPM | WEIGHT: 138 LBS | BODY MASS INDEX: 22.18 KG/M2

## 2022-08-13 DIAGNOSIS — J02.9 ACUTE PHARYNGITIS, UNSPECIFIED ETIOLOGY: Primary | ICD-10-CM

## 2022-08-13 LAB — S PYO AG THROAT QL: NEGATIVE

## 2022-08-13 PROCEDURE — 87880 STREP A ASSAY W/OPTIC: CPT | Performed by: PHYSICIAN ASSISTANT

## 2022-08-13 PROCEDURE — 99203 OFFICE O/P NEW LOW 30 MIN: CPT | Performed by: PHYSICIAN ASSISTANT

## 2022-08-13 RX ORDER — AMOXICILLIN 500 MG/1
500 TABLET, FILM COATED ORAL 2 TIMES DAILY
Qty: 20 TABLET | Refills: 0 | Status: SHIPPED | OUTPATIENT
Start: 2022-08-13 | End: 2022-08-23

## 2022-08-13 NOTE — PROGRESS NOTES
[facility]        NAME: Erlinda Chapman is a 28 y o  male  : 1990    MRN: [id number]  DATE: 2022  TIME: 9:23 AM    Assessment and Plan   Acute pharyngitis, unspecified etiology [J02 9]  1  Acute pharyngitis, unspecified etiology  amoxicillin (AMOXIL) 500 MG tablet    POCT rapid strepA     Recommend trial decongestants, and if no improvement over next few days then begin antibiotic  Discussed strict return to care precautions as well as red flag symptoms which should prompt immediate ED referral  Pt verbalized understanding and is in agreement with plan  Please follow up with your primary care provider within the next week  Please remember that your visit today was with an urgent care provider and should not replace follow up with your primary care provider for chronic medical issues or annual physicals  Patient Instructions       Follow up with PCP in 3-5 days  Proceed to  ER if symptoms worsen  Chief Complaint     Chief Complaint   Patient presents with    Sore Throat     Pt reports of sore throat x 4 days         History of Present Illness       Erlinda Chapman is a(n) 28 y o  male presenting with URI symptoms x 4 days  Past medical history: none  Congestion: no  Sore throat: yes, 5/10 in severity worse at night  Cough: no  Sputum production: no  Fever: yes, had Tmax 103F four days ago  Last fever was 2 days ago but sore throat persists  Body aches: no  Loss of smell/taste: no  GI symptoms: no  Known sick contacts: no  OTC meds tried: tylenol for fever  Vaccinated against COVID19: no  Took 4 rapid tests at home all came back negative        Review of Systems   Review of Systems   Constitutional: Positive for fever  Negative for chills, diaphoresis and fatigue  HENT: Positive for sore throat  Negative for congestion, ear pain, postnasal drip, rhinorrhea, sinus pain, sneezing and trouble swallowing  Eyes: Negative for pain and redness     Respiratory: Negative for cough, chest tightness, shortness of breath and wheezing  Cardiovascular: Negative for chest pain and leg swelling  Gastrointestinal: Negative for diarrhea, nausea and vomiting  Musculoskeletal: Negative for myalgias  Neurological: Negative for dizziness, weakness and headaches  Current Medications       Current Outpatient Medications:     amoxicillin (AMOXIL) 500 MG tablet, Take 1 tablet (500 mg total) by mouth 2 (two) times a day for 10 days, Disp: 20 tablet, Rfl: 0    benzonatate (TESSALON) 200 MG capsule, Take 1 capsule (200 mg total) by mouth 3 (three) times a day as needed for cough (Patient not taking: Reported on 11/22/2021 ), Disp: 20 capsule, Rfl: 0    fluticasone (FLONASE) 50 mcg/act nasal spray, 2 sprays into each nostril daily (Patient not taking: Reported on 11/22/2021 ), Disp: 16 g, Rfl: 0    fluticasone (FLONASE) 50 mcg/act nasal spray, 1 spray into each nostril 2 (two) times a day, Disp: 16 g, Rfl: 0    phenol (CHLORASEPTIC) 1 4 % mucosal liquid, Apply 1 spray to the mouth or throat every 2 (two) hours as needed (Sore throat), Disp: 20 mL, Rfl: 0    Current Allergies     Allergies as of 08/13/2022    (No Known Allergies)            The following portions of the patient's history were reviewed and updated as appropriate: allergies, current medications, past family history, past medical history, past social history, past surgical history and problem list      History reviewed  No pertinent past medical history  History reviewed  No pertinent surgical history  History reviewed  No pertinent family history  Medications have been verified  Objective   /93   Pulse 81   Temp 97 5 °F (36 4 °C)   Resp 18   Ht 5' 6" (1 676 m)   Wt 62 6 kg (138 lb)   SpO2 99%   BMI 22 27 kg/m²        Physical Exam     Physical Exam  Vitals and nursing note reviewed  Constitutional:       General: He is not in acute distress  Appearance: Normal appearance   He is not toxic-appearing  HENT:      Head: Normocephalic and atraumatic  Right Ear: Tympanic membrane, ear canal and external ear normal       Left Ear: Tympanic membrane, ear canal and external ear normal       Nose: No congestion  Mouth/Throat:      Mouth: Mucous membranes are moist       Pharynx: Oropharynx is clear  Uvula midline  Posterior oropharyngeal erythema present  No oropharyngeal exudate or uvula swelling  Tonsils: Tonsillar exudate (small amount, left) present  No tonsillar abscesses  2+ on the right  2+ on the left  Eyes:      Conjunctiva/sclera: Conjunctivae normal       Pupils: Pupils are equal, round, and reactive to light  Cardiovascular:      Rate and Rhythm: Normal rate and regular rhythm  Heart sounds: Normal heart sounds  Pulmonary:      Effort: Pulmonary effort is normal  No respiratory distress  Breath sounds: Normal breath sounds  No wheezing, rhonchi or rales  Abdominal:      General: Abdomen is flat  Palpations: Abdomen is soft  Musculoskeletal:      Cervical back: Normal range of motion and neck supple  Lymphadenopathy:      Cervical: Cervical adenopathy present  Skin:     General: Skin is warm and dry  Capillary Refill: Capillary refill takes less than 2 seconds  Neurological:      Mental Status: He is alert and oriented to person, place, and time     Psychiatric:         Behavior: Behavior normal

## 2023-07-04 ENCOUNTER — OFFICE VISIT (OUTPATIENT)
Dept: URGENT CARE | Facility: CLINIC | Age: 33
End: 2023-07-04
Payer: COMMERCIAL

## 2023-07-04 VITALS
TEMPERATURE: 98.9 F | BODY MASS INDEX: 22.21 KG/M2 | RESPIRATION RATE: 18 BRPM | HEART RATE: 76 BPM | DIASTOLIC BLOOD PRESSURE: 74 MMHG | OXYGEN SATURATION: 97 % | SYSTOLIC BLOOD PRESSURE: 120 MMHG | WEIGHT: 138.2 LBS | HEIGHT: 66 IN

## 2023-07-04 DIAGNOSIS — J02.9 SORE THROAT: Primary | ICD-10-CM

## 2023-07-04 LAB — S PYO AG THROAT QL: NEGATIVE

## 2023-07-04 PROCEDURE — 87070 CULTURE OTHR SPECIMN AEROBIC: CPT | Performed by: PHYSICIAN ASSISTANT

## 2023-07-04 PROCEDURE — 99214 OFFICE O/P EST MOD 30 MIN: CPT | Performed by: PHYSICIAN ASSISTANT

## 2023-07-04 PROCEDURE — 87880 STREP A ASSAY W/OPTIC: CPT | Performed by: PHYSICIAN ASSISTANT

## 2023-07-04 PROCEDURE — 87147 CULTURE TYPE IMMUNOLOGIC: CPT | Performed by: PHYSICIAN ASSISTANT

## 2023-07-04 NOTE — PATIENT INSTRUCTIONS
Negative strep test here. I will send your throat culture out to the lab and call you if you test positive for strep.

## 2023-07-04 NOTE — PROGRESS NOTES
[facility]        NAME: Valerie Shepherd is a 28 y.o. male  : 1990    MRN: [id number]  DATE: 2023  TIME: 9:05 AM    Assessment and Plan   Sore throat [J02.9]  1. Sore throat  POCT rapid strepA    Throat culture            Patient Instructions   Patient Instructions   Negative strep test here. I will send your throat culture out to the lab and call you if you test positive for strep. Follow up with PCP in 3-5 days. Proceed to  ER if symptoms worsen. Chief Complaint     Chief Complaint   Patient presents with   • Sore Throat   • Cold Like Symptoms     X 4 days - sore throat with fever 100.5 last night. Had HA and nausea yesterday and some nasal congestion. History of Present Illness       The patient is a 26-year-old male presenting today for 4 days of a sore throat, headache, nausea, nasal congestion and a fever. Temp last night was 100.5. Reports concern for strep. He admits that today he woke up feeling a little better. Review of Systems   Review of Systems   Constitutional: Positive for fever. Negative for activity change, appetite change, chills and diaphoresis. HENT: Positive for congestion and sore throat. Negative for ear pain and rhinorrhea. Eyes: Negative for pain and visual disturbance. Respiratory: Negative for cough, chest tightness and shortness of breath. Cardiovascular: Negative for chest pain and palpitations. Gastrointestinal: Positive for nausea. Negative for abdominal pain, diarrhea and vomiting. Genitourinary: Negative for dysuria and hematuria. Musculoskeletal: Negative for arthralgias, back pain and myalgias. Skin: Negative for color change, pallor and rash. Neurological: Positive for headaches. Negative for seizures and syncope. All other systems reviewed and are negative. Current Medications     No current outpatient medications on file.     Current Allergies     Allergies as of 2023   • (No Known Allergies)            The following portions of the patient's history were reviewed and updated as appropriate: allergies, current medications, past family history, past medical history, past social history, past surgical history and problem list.     History reviewed. No pertinent past medical history. History reviewed. No pertinent surgical history. No family history on file. Medications have been verified. Objective   /74   Pulse 76   Temp 98.9 °F (37.2 °C)   Resp 18   Ht 5' 6" (1.676 m)   Wt 62.7 kg (138 lb 3.2 oz)   SpO2 97%   BMI 22.31 kg/m²        Physical Exam     Physical Exam  Vitals and nursing note reviewed. Constitutional:       General: He is not in acute distress. Appearance: Normal appearance. He is well-developed and normal weight. He is not ill-appearing, toxic-appearing or diaphoretic. HENT:      Head: Normocephalic and atraumatic. Right Ear: Tympanic membrane and ear canal normal.      Left Ear: Tympanic membrane and ear canal normal.      Nose: Nose normal. No congestion or rhinorrhea. Mouth/Throat:      Mouth: Mucous membranes are moist. No oral lesions. Pharynx: Oropharynx is clear. Uvula midline. Posterior oropharyngeal erythema present. No pharyngeal swelling, oropharyngeal exudate or uvula swelling. Cardiovascular:      Rate and Rhythm: Normal rate and regular rhythm. Heart sounds: Normal heart sounds. No murmur heard. No friction rub. No gallop. Pulmonary:      Effort: Pulmonary effort is normal. No respiratory distress. Breath sounds: Normal breath sounds. No stridor. No wheezing, rhonchi or rales. Chest:      Chest wall: No tenderness. Abdominal:      General: Abdomen is flat. Bowel sounds are normal. There is no distension. Palpations: Abdomen is soft. There is no mass. Tenderness: There is no abdominal tenderness. There is no guarding or rebound. Hernia: No hernia is present.    Musculoskeletal: Cervical back: Normal range of motion. Lymphadenopathy:      Cervical: No cervical adenopathy. Skin:     General: Skin is warm and dry. Capillary Refill: Capillary refill takes less than 2 seconds. Neurological:      Mental Status: He is alert.

## 2023-07-05 ENCOUNTER — OFFICE VISIT (OUTPATIENT)
Dept: URGENT CARE | Facility: CLINIC | Age: 33
End: 2023-07-05
Payer: COMMERCIAL

## 2023-07-05 VITALS
BODY MASS INDEX: 22.34 KG/M2 | DIASTOLIC BLOOD PRESSURE: 74 MMHG | TEMPERATURE: 100.5 F | HEART RATE: 101 BPM | HEIGHT: 66 IN | SYSTOLIC BLOOD PRESSURE: 134 MMHG | RESPIRATION RATE: 16 BRPM | WEIGHT: 139 LBS | OXYGEN SATURATION: 98 %

## 2023-07-05 DIAGNOSIS — J02.9 SORE THROAT: Primary | ICD-10-CM

## 2023-07-05 LAB
BACTERIA THROAT CULT: ABNORMAL
S PYO AG THROAT QL: NEGATIVE

## 2023-07-05 PROCEDURE — 87070 CULTURE OTHR SPECIMN AEROBIC: CPT | Performed by: FAMILY MEDICINE

## 2023-07-05 PROCEDURE — 87147 CULTURE TYPE IMMUNOLOGIC: CPT | Performed by: FAMILY MEDICINE

## 2023-07-05 PROCEDURE — 99213 OFFICE O/P EST LOW 20 MIN: CPT | Performed by: FAMILY MEDICINE

## 2023-07-05 PROCEDURE — 87880 STREP A ASSAY W/OPTIC: CPT | Performed by: FAMILY MEDICINE

## 2023-07-05 RX ORDER — PENICILLIN V POTASSIUM 500 MG/1
500 TABLET ORAL EVERY 12 HOURS
Qty: 20 TABLET | Refills: 0 | Status: SHIPPED | OUTPATIENT
Start: 2023-07-05 | End: 2023-07-15

## 2023-07-05 NOTE — PROGRESS NOTES
[facility]        NAME: Dharmesh Rich is a 28 y.o. male  : 1990    MRN: [id number]  DATE: 2023  TIME: 9:25 AM    Assessment and Plan   Sore throat [J02.9]  1. Sore throat  POCT rapid strepA    Throat culture    penicillin V potassium (VEETID) 500 mg tablet        Negative rapid strep. However Cent[facility] criteria raise concern for nongroup a strep pharyngitis. Will confirm the presence or absence with a throat culture and preemptively treat with penicillin VK for 10 days. Patient advised on gargling with warm salt water twice daily and to avoid cold fluids. May consider Chloraseptic throat spray/lozenges for pain control. Patient Instructions     Follow up with PCP in 3-5 days. Proceed to  ER if symptoms worsen. Chief Complaint     Chief Complaint   Patient presents with   • Sore Throat     Patient states  he started with sore throat. He has been getting worse and is now experiencing fever, nausea, and headaches. He has been using Tylenol but it is not helping. History of Present Illness       28-year-old male presents today with 3 days of progressively worsening sore throat associated with headaches, nausea, odynophagia, fevers and chills. Denies any obvious sick contacts. Has been taking Tylenol, but symptoms persist.      Review of Systems   Review of Systems   Constitutional: Positive for chills and fever. HENT: Positive for postnasal drip, sore throat and trouble swallowing. Negative for congestion and rhinorrhea. Respiratory: Negative for cough and shortness of breath. Cardiovascular: Negative for chest pain. Gastrointestinal: Positive for nausea. Negative for abdominal pain. Neurological: Positive for headaches. Negative for dizziness.      Current Medications       Current Outpatient Medications:   •  penicillin V potassium (VEETID) 500 mg tablet, Take 1 tablet (500 mg total) by mouth every 12 (twelve) hours for 10 days, Disp: 20 tablet, Rfl: 0    Current Allergies     Allergies as of 07/05/2023   • (No Known Allergies)            The following portions of the patient's history were reviewed and updated as appropriate: allergies, current medications, past family history, past medical history, past social history, past surgical history and problem list.     History reviewed. No pertinent past medical history. History reviewed. No pertinent surgical history. History reviewed. No pertinent family history. Medications have been verified. Objective   /74   Pulse 101   Temp 100.5 °F (38.1 °C)   Resp 16   Ht 5' 6" (1.676 m)   Wt 63 kg (139 lb)   SpO2 98%   BMI 22.44 kg/m²   No LMP for male patient. Physical Exam     Physical Exam  Vitals and nursing note reviewed. Constitutional:       General: He is in acute distress. Appearance: Normal appearance. He is well-developed and normal weight. He is not ill-appearing, toxic-appearing or diaphoretic. HENT:      Head: Normocephalic and atraumatic. Mouth/Throat:      Mouth: Mucous membranes are moist.      Pharynx: Uvula midline. Posterior oropharyngeal erythema present. No pharyngeal swelling. Tonsils: No tonsillar exudate. 1+ on the right. 1+ on the left. Eyes:      General:         Right eye: No discharge. Left eye: No discharge. Conjunctiva/sclera: Conjunctivae normal.   Pulmonary:      Effort: Pulmonary effort is normal.   Lymphadenopathy:      Cervical: Cervical adenopathy present. Skin:     General: Skin is warm. Findings: No erythema. Neurological:      General: No focal deficit present. Mental Status: He is alert and oriented to person, place, and time. Psychiatric:         Mood and Affect: Mood normal.         Behavior: Behavior normal.         Thought Content:  Thought content normal.         Judgment: Judgment normal.

## 2023-07-06 LAB — BACTERIA THROAT CULT: ABNORMAL

## 2024-08-12 ENCOUNTER — OFFICE VISIT (OUTPATIENT)
Dept: OBGYN CLINIC | Facility: CLINIC | Age: 34
End: 2024-08-12
Payer: COMMERCIAL

## 2024-08-12 ENCOUNTER — APPOINTMENT (OUTPATIENT)
Dept: RADIOLOGY | Facility: CLINIC | Age: 34
End: 2024-08-12
Payer: COMMERCIAL

## 2024-08-12 VITALS
DIASTOLIC BLOOD PRESSURE: 78 MMHG | WEIGHT: 148 LBS | SYSTOLIC BLOOD PRESSURE: 121 MMHG | HEART RATE: 76 BPM | BODY MASS INDEX: 23.78 KG/M2 | HEIGHT: 66 IN

## 2024-08-12 DIAGNOSIS — M25.511 RIGHT SHOULDER PAIN, UNSPECIFIED CHRONICITY: ICD-10-CM

## 2024-08-12 DIAGNOSIS — S46.011A ROTATOR CUFF STRAIN, RIGHT, INITIAL ENCOUNTER: ICD-10-CM

## 2024-08-12 DIAGNOSIS — S46.211A BICEPS STRAIN, RIGHT, INITIAL ENCOUNTER: Primary | ICD-10-CM

## 2024-08-12 PROCEDURE — 73030 X-RAY EXAM OF SHOULDER: CPT

## 2024-08-12 PROCEDURE — 99203 OFFICE O/P NEW LOW 30 MIN: CPT | Performed by: ORTHOPAEDIC SURGERY

## 2024-08-12 NOTE — PROGRESS NOTES
Orthopedic Sports Medicine New Patient Visit     Assesment:   34 y.o. male with right biceps strain, rotator cuff strain    Plan:    Upon examination today, the patient has overall well-maintained shoulder motion and strength such that I believe his rotator cuff function remains intact. He does have minimal pain elicited with some rotator cuff and biceps tendon testing, such that we discussed the diagnosis and treatment plan for strains of these areas. I recommended beginning a course of PT that focuses on shoulder mobility, strength, and stabilization, then progresses to throwing as tolerated. I recommend he avoid throwing for another 2 weeks. He can try hitting, but can avoid this as well if he has any pain or difficulty swinging. He can continue work and other activities as tolerated, using pain as a guide. Junaid can use OTC medications and ice/heat as needed for pain. We will follow up with him in 6 weeks following PT for recheck. If the patient's symptoms persist or worsen, we may consider obtaining an MRI at that time if necessary.           History of Present Illness:    The patient is a 34 y.o., right hand dominant, male, presenting for initial evaluation of traumatic right shoulder pain localized to the anterolateral aspect for about 1.5 weeks. 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 The patient noted some improvement in symptoms for a couple of days following the injury, but has since plateaued. He denies neck pain, numbness/tingling, or weakness. He has not tried any home therapies so far.     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    Shoulder Surgical History:  None    Past Medical, Social and Family History:  No past medical history on file.  No past surgical history on file.  No Known Allergies  No current outpatient medications on file prior to visit.  "    No current facility-administered medications on file prior to visit.     Social History     Socioeconomic History    Marital status: Single     Spouse name: Not on file    Number of children: Not on file    Years of education: Not on file    Highest education level: Not on file   Occupational History    Not on file   Tobacco Use    Smoking status: Never    Smokeless tobacco: Never   Vaping Use    Vaping status: Never Used   Substance and Sexual Activity    Alcohol use: Yes     Comment: occ    Drug use: Never    Sexual activity: Not on file   Other Topics Concern    Not on file   Social History Narrative    Not on file     Social Determinants of Health     Financial Resource Strain: Not on file   Food Insecurity: Not on file   Transportation Needs: Not on file   Physical Activity: Not on file   Stress: Not on file   Social Connections: Not on file   Intimate Partner Violence: Not on file   Housing Stability: Not on file         I have reviewed the past medical, surgical, social and family history, medications and allergies as documented in the EMR.    Review of systems: ROS is negative other than that noted in the HPI.  Constitutional: Negative for fatigue and fever.   HENT: Negative for sore throat.    Respiratory: Negative for shortness of breath.    Cardiovascular: Negative for chest pain.   Gastrointestinal: Negative for abdominal pain.   Endocrine: Negative for cold intolerance and heat intolerance.   Genitourinary: Negative for flank pain.   Musculoskeletal: Negative for back pain.   Skin: Negative for rash.   Allergic/Immunologic: Negative for immunocompromised state.   Neurological: Negative for dizziness.   Psychiatric/Behavioral: Negative for agitation.      Physical Exam:    Blood pressure 121/78, pulse 76, height 5' 6\" (1.676 m), weight 67.1 kg (148 lb).    General/Constitutional: NAD, well developed, well nourished  HENT: Normocephalic, atraumatic  CV: Intact distal pulses, regular rate  Resp: No " respiratory distress or labored breathing  Abdomen: soft, nondistended, non tender   Lymphatic: No lymphadenopathy palpated  Neuro: Alert and Oriented x 3, no focal deficits  Psych: Normal mood, normal affect  Skin: Warm, dry, no rashes, no erythema      Shoulder Exam:      Inspection: No ecchymosis, edema, or deformity. No visualized wounds or skin lesions   Palpation: No AC joint, cervical midline, or raffy-scapular tenderness. Moderate bicipital groove tenderness  Active Motion:       FF: 160, symmetric without pain        ER: 50, symmetric with minimal pain        IR: T12, symmetric with minimal pain  Strength: 5/5 empty can with minimal pain, 5/5 ER,  5/5 IR   Sensory - SILT in the Radial / Ulnar / Median / Axillary nerve distributions  Motor - AIN / PIN / Radial / Ulnar / Median / Axillary motor nerves in tact  Palpable Radial pulse  Cap refill <2secs in all digits    5/5 Belly Press with minimal pain    + Ornelas      Imaging    I reviewed and interpreted X-rays of the right shoulder which show no acute osseous abnormalities or significant degenerative changes. Humerus is well-centered on the glenoid.

## 2024-08-28 ENCOUNTER — EVALUATION (OUTPATIENT)
Dept: PHYSICAL THERAPY | Facility: CLINIC | Age: 34
End: 2024-08-28
Payer: COMMERCIAL

## 2024-08-28 DIAGNOSIS — S46.211A BICEPS STRAIN, RIGHT, INITIAL ENCOUNTER: ICD-10-CM

## 2024-08-28 DIAGNOSIS — S46.011A ROTATOR CUFF STRAIN, RIGHT, INITIAL ENCOUNTER: ICD-10-CM

## 2024-08-28 PROCEDURE — 97110 THERAPEUTIC EXERCISES: CPT

## 2024-08-28 PROCEDURE — 97161 PT EVAL LOW COMPLEX 20 MIN: CPT

## 2024-08-28 NOTE — PROGRESS NOTES
PT Evaluation     Today's date: 2024  Patient name: Junaid Mijares  : 1990  MRN: [id number]  Referring provider: Devika Cespedes PA-C  Dx:   Encounter Diagnosis     ICD-10-CM    1. Biceps strain, right, initial encounter  S46.211A Ambulatory Referral to Physical Therapy      2. Rotator cuff strain, right, initial encounter  S46.011A Ambulatory Referral to Physical Therapy                     Assessment  Impairments: abnormal muscle firing, abnormal or restricted ROM, activity intolerance, impaired physical strength, lacks appropriate home exercise program, pain with function, scapular dyskinesis, poor posture  and poor body mechanics    Assessment details: Junaid Mijares is a 34 y.o. male who presents to hospital-based outpatient PT with subacute right shoulder pain after a sport-related fall onto the right shoulder. Patient presents with the following impairments: right shoulder pain, painful, but full AROM, and limited/painful strength testing. Due to these impairments, patient has difficulty performing the following: overhead reaching, reaching behind the back, pushing/pulling, lifting overhead, self-care activities, household chores, reaching across the body, and performing prior exercise regimen. Patient has been educated in home exercise program and plan of care. Patient would benefit from skilled physical therapy services to address the above functional limitations and progress towards prior level of function and independence with home exercise program.  Understanding of Dx/Px/POC: good     Prognosis: good    Goals  Short Term Goals [3 weeks; target date: 10/1/24]  1. Patient will be independent with initial HEP.  2. Patient will present with full, pain-free right shoulder AROM.   3. Patient will demonstrate an increase in right shoulder strength of 1/2 grade on MMT.    Long Term Goals [6 weeks; target date: 24]  1. Patient will be independent with comprehensive HEP.    2. Patient will  demonstrate an increase in right shoulder AROM to WNL in order to promote bathing/dressing without pain.  3. Patient will demonstrate an increase in right shoulder strength to 5/5 on MMT in order to promote pain-free carrying/lifting.  4. Patient will be able to place a 5-8 lb. weight on a shelf above shoulder height with proper scapulohumeral mechanics.   5. Patient will be able to return to self-directed exercise program in a gym setting.     Plan  Patient would benefit from: skilled physical therapy  Planned modality interventions: cryotherapy, electrical stimulation/Russian stimulation, TENS, ultrasound, high voltage pulsed current: pain management, thermotherapy: hydrocollator packs, unattended electrical stimulation and high voltage pulsed current: spasm management    Planned therapy interventions: flexibility, functional ROM exercises, graded exercise, home exercise program, joint mobilization, manual therapy, neuromuscular re-education, patient education, strengthening, stretching, therapeutic exercise, therapeutic activities, activity modification, postural training, body mechanics training, graded activity, self care, muscle pump exercises, abdominal trunk stabilization, ADL retraining, ADL training, IADL retraining, breathing training, behavior modification, therapeutic training, transfer training and Parker taping    Frequency: 1-2x week  Duration in weeks: 6  Plan of Care beginning date: 8/28/2024  Plan of Care expiration date: 11/28/2024  Treatment plan discussed with: patient  Plan details: Patient has verbalized understanding and agreement with insurance verification form.       Subjective Evaluation    History of Present Illness  Date of onset: 8/4/2024  Mechanism of injury: Pt reports that he plays in men's baseball league and dove to catch a ball. Pt states he lost feeling in his entire arm and hand for a few hours. Pt states the next day, he had difficulty moving his arm, but it got better  "over time. Pt states he tries to work out often and was working out 4x/week, but his shoulder was not improving. Pt states he went to orthopedics and mobility was good, but certain motions hurt, like reaching behind and reaching all the way up. Pt states he also has shooting pain into his right upper trap and the front of the chest. Pt states he has since attempted to throw a ball, which hurt and did not go well. Pt states he works as a  and has discomfort with his work, but can still get it done.   Patient Goals  Patient goal: To get back to working out  Pain  Current pain ratin  At best pain rating: 3  At worst pain ratin  Location: Anterior right shoulder  Quality: \"nagging, burning\"  Alleviating factors: None.  Aggravating factors: overhead activity (reaching behind the back, reaching across the body)  Progression: improved    Social Support    Employment status: working (; >40 hours/week)  Hand dominance: right  Exercise history: 4x/week strength and cardio      Diagnostic Tests  X-ray: normal      Objective     Active Range of Motion   Left Shoulder   Flexion: 178 degrees   Abduction: 180 degrees   External rotation 0°: 90 degrees   Internal rotation 0°: WFL    Right Shoulder   Flexion: 170 degrees with pain  Abduction: 175 degrees with pain  External rotation 0°: 65 degrees with pain  Internal rotation 0°: WFL    Strength/Myotome Testing     Left Shoulder     Planes of Motion   Flexion: 5   Abduction: 5   External rotation at 0°: 5   Internal rotation at 0°: 5     Right Shoulder     Planes of Motion   Flexion: 4   Abduction: 4   External rotation at 0°: 4-   Internal rotation at 0°: 5            Insurance:  AMA/CMS Eval/ Re-eval POC expires FOTO Co-Insurance   AMA - aetna 24  $15 Co-pay                                    .   2.  3.  4.  5.  6.    7.  8.  9.  10.  11.  12.    13.  14.  15.  16.  17.  18.    19.  20. 21. 22. 23. 24.    25.  26. 27. 28. 29. 30.   31. 32.  " 33. 34. 35. 36.      Precautions: Standard     EVAL 2 3 4 5 6   Manuals 8/28/24        STM/MFR          Joint mobs                  Ther Ex         PROM R shoulder         Cane ER 5x5s         Cane ext         Behind the back stretch         Theraband ER 5x GTB                                                     Neuro Re-Ed         Rows 10x 12.5# Inc       Shoulder extensions                                                                                          Ther Activity         Pt education POC, HEP                                                              Modalities                               Access Code: [email or web address]  URL: [email or web address]  Date: 08/28/2024  Prepared by: Lobo Martinez    Exercises  - Supine Shoulder External Rotation with Dowel  - 2 x daily - 7 x weekly - 2 sets - 10 reps - 5 hold  - Shoulder External Rotation with Anchored Resistance  - 1 x daily - 7 x weekly - 3 sets - 10 reps - 3 hold  - Standing Row with Anchored Resistance  - 1 x daily - 7 x weekly - 3 sets - 10 reps - 3 hold

## 2024-09-12 ENCOUNTER — OFFICE VISIT (OUTPATIENT)
Dept: OBGYN CLINIC | Facility: CLINIC | Age: 34
End: 2024-09-12
Payer: COMMERCIAL

## 2024-09-12 VITALS — BODY MASS INDEX: 23.78 KG/M2 | WEIGHT: 148 LBS | HEIGHT: 66 IN

## 2024-09-12 DIAGNOSIS — M24.811 INTERNAL DERANGEMENT OF RIGHT SHOULDER: Primary | ICD-10-CM

## 2024-09-12 PROCEDURE — 99213 OFFICE O/P EST LOW 20 MIN: CPT | Performed by: ORTHOPAEDIC SURGERY

## 2024-09-15 NOTE — PROGRESS NOTES
Orthopedic Sports Medicine follow-up patient Visit     Assesment:   34 y.o. male with right biceps strain, rotator cuff strain    Plan:    The patient had a traumatic injury with weakness and pain on exam.  Prior to this injury his shoulder was completely normal.  He did start physical therapy but his symptoms have worsened since starting therapy.  At this point I feel advanced imaging is indicated to evaluate for the possibility of acute traumatic rotator cuff tear.  I ordered the MRI today.  He is going to see me after MRI is completed to review and discuss further treatment plan.      History of Present Illness:    The patient is a 34 y.o., right hand dominant, male, returning for evaluation of his shoulder after an acute injury.  Since last visit he has started physical therapy for this symptoms have continued to worsen.  He continues to struggle with overhead and reaching activities.  Prior to this injury he had normal shoulder function with no issues.    Shoulder Surgical History:  None    Past Medical, Social and Family History:  No past medical history on file.  No past surgical history on file.  No Known Allergies  No current outpatient medications on file prior to visit.     No current facility-administered medications on file prior to visit.     Social History     Socioeconomic History    Marital status: Single     Spouse name: Not on file    Number of children: Not on file    Years of education: Not on file    Highest education level: Not on file   Occupational History    Not on file   Tobacco Use    Smoking status: Never    Smokeless tobacco: Never   Vaping Use    Vaping status: Never Used   Substance and Sexual Activity    Alcohol use: Yes     Comment: occ    Drug use: Never    Sexual activity: Not on file   Other Topics Concern    Not on file   Social History Narrative    Not on file     Social Determinants of Health     Financial Resource Strain: Not on file   Food Insecurity: Not on file  "  Transportation Needs: Not on file   Physical Activity: Not on file   Stress: Not on file   Social Connections: Not on file   Intimate Partner Violence: Not on file   Housing Stability: Not on file         I have reviewed the past medical, surgical, social and family history, medications and allergies as documented in the EMR.    Review of systems: ROS is negative other than that noted in the HPI.  Constitutional: Negative for fatigue and fever.   HENT: Negative for sore throat.    Respiratory: Negative for shortness of breath.    Cardiovascular: Negative for chest pain.   Gastrointestinal: Negative for abdominal pain.   Endocrine: Negative for cold intolerance and heat intolerance.   Genitourinary: Negative for flank pain.   Musculoskeletal: Negative for back pain.   Skin: Negative for rash.   Allergic/Immunologic: Negative for immunocompromised state.   Neurological: Negative for dizziness.   Psychiatric/Behavioral: Negative for agitation.      Physical Exam:    Height 5' 6\" (1.676 m), weight 67.1 kg (148 lb).    General/Constitutional: NAD, well developed, well nourished  HENT: Normocephalic, atraumatic  CV: Intact distal pulses, regular rate  Resp: No respiratory distress or labored breathing  Abdomen: soft, nondistended, non tender   Lymphatic: No lymphadenopathy palpated  Neuro: Alert and Oriented x 3, no focal deficits  Psych: Normal mood, normal affect  Skin: Warm, dry, no rashes, no erythema      Shoulder Exam:      Inspection: No ecchymosis, edema, or deformity. No visualized wounds or skin lesions   Palpation: No AC joint, cervical midline, or raffy-scapular tenderness. Moderate bicipital groove tenderness  Active Motion:       FF: 160, symmetric without pain        ER: 50, symmetric with minimal pain        IR: T12, symmetric with minimal pain  Strength: 4/5 empty can with pain, 4/5 ER,  5/5 IR   Sensory - SILT in the Radial / Ulnar / Median / Axillary nerve distributions  Motor - AIN / PIN / Radial / " Ulnar / Median / Axillary motor nerves in tact  Palpable Radial pulse  Cap refill <2secs in all digits    5/5 Belly Press with minimal pain    + Ornelas      Imaging    I reviewed and interpreted X-rays of the right shoulder which show no acute osseous abnormalities or significant degenerative changes. Humerus is well-centered on the glenoid.

## 2024-10-14 ENCOUNTER — HOSPITAL ENCOUNTER (OUTPATIENT)
Dept: MRI IMAGING | Facility: HOSPITAL | Age: 34
Discharge: HOME | End: 2024-10-14
Attending: ORTHOPAEDIC SURGERY
Payer: COMMERCIAL

## 2024-10-14 ENCOUNTER — HOSPITAL ENCOUNTER (OUTPATIENT)
Dept: RADIOLOGY | Facility: HOSPITAL | Age: 34
Discharge: HOME | End: 2024-10-14
Attending: ORTHOPAEDIC SURGERY
Payer: COMMERCIAL

## 2024-10-14 DIAGNOSIS — M24.811 INTERNAL DERANGEMENT OF RIGHT SHOULDER: ICD-10-CM

## 2024-10-14 PROCEDURE — 23350 INJECTION FOR SHOULDER X-RAY: CPT

## 2024-10-14 PROCEDURE — A9585 GADOBUTROL INJECTION: HCPCS | Performed by: ORTHOPAEDIC SURGERY

## 2024-10-14 PROCEDURE — 73222 MRI JOINT UPR EXTREM W/DYE: CPT

## 2024-10-14 PROCEDURE — 77002 NEEDLE LOCALIZATION BY XRAY: CPT

## 2024-10-14 RX ORDER — GADOBUTROL 604.72 MG/ML
0.2 INJECTION INTRAVENOUS
Status: COMPLETED | OUTPATIENT
Start: 2024-10-14 | End: 2024-10-14

## 2024-10-14 RX ADMIN — IOHEXOL 5 ML: 300 INJECTION, SOLUTION INTRAVENOUS at 10:43

## 2024-10-14 RX ADMIN — GADOBUTROL 0.2 ML: 604.72 INJECTION INTRAVENOUS at 10:40

## 2024-10-24 ENCOUNTER — OFFICE VISIT (OUTPATIENT)
Dept: OBGYN CLINIC | Facility: CLINIC | Age: 34
End: 2024-10-24
Payer: COMMERCIAL

## 2024-10-24 VITALS
SYSTOLIC BLOOD PRESSURE: 135 MMHG | BODY MASS INDEX: 23.78 KG/M2 | HEIGHT: 66 IN | HEART RATE: 76 BPM | DIASTOLIC BLOOD PRESSURE: 80 MMHG | WEIGHT: 148 LBS

## 2024-10-24 DIAGNOSIS — S43.431A GLENOID LABRAL TEAR, RIGHT, INITIAL ENCOUNTER: Primary | ICD-10-CM

## 2024-10-24 DIAGNOSIS — M67.813 TENDINOSIS OF RIGHT ROTATOR CUFF: ICD-10-CM

## 2024-10-24 PROCEDURE — 99213 OFFICE O/P EST LOW 20 MIN: CPT | Performed by: ORTHOPAEDIC SURGERY

## 2024-10-24 RX ORDER — SYRINGE, DISPOSABLE, 3 ML
SYRINGE, EMPTY DISPOSABLE MISCELLANEOUS
COMMUNITY
Start: 2024-10-14

## 2024-10-24 RX ORDER — TESTOSTERONE CYPIONATE 200 MG/ML
INJECTION, SOLUTION INTRAMUSCULAR
COMMUNITY
Start: 2024-10-10

## 2024-10-24 RX ORDER — NEEDLES, SAFETY 18GX1"
NEEDLE, DISPOSABLE MISCELLANEOUS
COMMUNITY
Start: 2024-10-11

## 2024-10-24 NOTE — PROGRESS NOTES
"Orthopedic Sports Medicine follow-up patient Visit     Assesment:   34 y.o. male with right glenoid labral tear    Plan:    Upon examination today, the patient does have positive Apprehension and Relocation tests. He does have some pain with shoulder mobility and rotator cuff strength testing, however, his mobility and strength are overall well-maintained. We also reviewed Junaid's right shoulder MRI today, which demonstrates a glenoid labral tear and rotator cuff tendinosis, which correlates well with his exam. As such, we discussed non-operative and treatment options. Specifically, non-operative treatments include additional consistent PT for 6-8 weeks, OTC medications as needed, consideration of injection options, and activities as tolerated. Surgery would involve a right shoulder arthroscopy and labral repair. This does involve 6 weeks in a sling at all times and a 4-6 month recovery before returning back to full duty. After discussion, the patient prefers to start PT to focus on shoulder mobility and stabilization with progression to strengthening. I believe this is reasonable and we will follow up with him in 6 weeks for recheck. If his symptoms persist or worsen by that time, we can further discuss surgical intervention. In the meantime, he can use OTC medications and ice/heat as needed. He can continue activities and work as tolerated, using pain as a guide.      History of Present Illness:    The patient is a 34 y.o., right hand dominant, male, presenting for follow up of evaluation of his right shoulder and MRI review. Today, the patient notes more constant pain throughout the day and pain is causing sleep disturbances. Pain is localized to the anterior and posterolateral aspects with overhead motions, heavy lifting, and throwing. The patient works as a  and notes a \"pulling\" sensation and as if the shoulder \"is falling out\" with attempting to throw and overhead/heavy lifting. He states pain is no " "better since his injury in the beginning of August. The patient denies new injury/trauma or numbness/tingling. Junaid did attend 1 session of PT then self-discontinued after experiencing cracking sensations. He is not currently using any home therapies.     Shoulder Surgical History:  None    Past Medical, Social and Family History:  History reviewed. No pertinent past medical history.  Past Surgical History:   Procedure Laterality Date    FL INJECTION RIGHT SHOULDER (ARTHROGRAM)  10/14/2024     No Known Allergies  Current Outpatient Medications on File Prior to Visit   Medication Sig Dispense Refill    Easy Touch FlipLock Needles 18G X 1\" MISC USE 1 NEEDLE INTRAMUSCULARLY ONCE A WEEK AS DIRECTED TO DRAW TESTISTERONE      MONOJECT 3CC SYRINGE 3 ML MISC Use as directed      testosterone cypionate (DEPO-TESTOSTERONE) 200 mg/mL SOLN INJECT 1 ML INTRAMUSCULARLY ONCE A WEEK AS DIRECTED       No current facility-administered medications on file prior to visit.     Social History     Socioeconomic History    Marital status: Single     Spouse name: Not on file    Number of children: Not on file    Years of education: Not on file    Highest education level: Not on file   Occupational History    Not on file   Tobacco Use    Smoking status: Never    Smokeless tobacco: Never   Vaping Use    Vaping status: Never Used   Substance and Sexual Activity    Alcohol use: Not Currently     Comment: occ    Drug use: Never    Sexual activity: Yes     Partners: Female     Birth control/protection: None   Other Topics Concern    Not on file   Social History Narrative    Not on file     Social Determinants of Health     Financial Resource Strain: Not on file   Food Insecurity: Not on file   Transportation Needs: Not on file   Physical Activity: Not on file   Stress: Not on file   Social Connections: Not on file   Intimate Partner Violence: Not on file   Housing Stability: Not on file         I have reviewed the past medical, surgical, social " "and family history, medications and allergies as documented in the EMR.    Review of systems: ROS is negative other than that noted in the HPI.  Constitutional: Negative for fatigue and fever.   HENT: Negative for sore throat.    Respiratory: Negative for shortness of breath.    Cardiovascular: Negative for chest pain.   Gastrointestinal: Negative for abdominal pain.   Endocrine: Negative for cold intolerance and heat intolerance.   Genitourinary: Negative for flank pain.   Musculoskeletal: Negative for back pain.   Skin: Negative for rash.   Allergic/Immunologic: Negative for immunocompromised state.   Neurological: Negative for dizziness.   Psychiatric/Behavioral: Negative for agitation.      Physical Exam:    Blood pressure 135/80, pulse 76, height 5' 6\" (1.676 m), weight 67.1 kg (148 lb).    General/Constitutional: NAD, well developed, well nourished  HENT: Normocephalic, atraumatic  CV: Intact distal pulses, regular rate  Resp: No respiratory distress or labored breathing  Abdomen: soft, nondistended, non tender   Lymphatic: No lymphadenopathy palpated  Neuro: Alert and Oriented x 3, no focal deficits  Psych: Normal mood, normal affect  Skin: Warm, dry, no rashes, no erythema      Shoulder Exam:      Inspection: No ecchymosis, edema, or deformity. No visualized wounds or skin lesions   Palpation: No AC joint, cervical midline, or raffy-scapular tenderness. Moderate bicipital groove tenderness  Active Motion:       FF: 150 with pain compared to 160        ER: 50, symmetric with minimal pain        IR: T12, symmetric without  Strength: 4+/5 empty can with pain, 4+/5 ER with pain,  5/5 IR   Sensory - SILT in the Radial / Ulnar / Median / Axillary nerve distributions  Motor - AIN / PIN / Radial / Ulnar / Median / Axillary motor nerves in tact  Palpable Radial pulse  Cap refill <2secs in all digits    - Lift Off  4+/5 Belly Press with pain  5/5 Bear Hug without pain      + Apprehension  + Relocation  Pain with Load " and Shift      Imaging    I reviewed and interpreted X-rays of the right shoulder which show no acute osseous abnormalities or significant degenerative changes. Humerus is well-centered on the glenoid.     I reviewed and interpreted X-rays of the right shoulder which demonstrate IMPRESSION:     Anterior inferior labral tear.     Mid posterior glenoid labral tear.     No rotator cuff tear. Mild supraspinatus tendinosis.

## 2025-02-11 ENCOUNTER — OFFICE VISIT (OUTPATIENT)
Dept: URGENT CARE | Facility: CLINIC | Age: 35
End: 2025-02-11
Payer: COMMERCIAL

## 2025-02-11 VITALS
WEIGHT: 154 LBS | DIASTOLIC BLOOD PRESSURE: 74 MMHG | BODY MASS INDEX: 24.75 KG/M2 | HEART RATE: 89 BPM | HEIGHT: 66 IN | SYSTOLIC BLOOD PRESSURE: 118 MMHG | OXYGEN SATURATION: 98 % | RESPIRATION RATE: 18 BRPM | TEMPERATURE: 98.1 F

## 2025-02-11 DIAGNOSIS — J06.9 UPPER RESPIRATORY VIRUS: Primary | ICD-10-CM

## 2025-02-11 PROCEDURE — 99213 OFFICE O/P EST LOW 20 MIN: CPT | Performed by: PHYSICIAN ASSISTANT

## 2025-02-11 RX ORDER — BENZONATATE 200 MG/1
200 CAPSULE ORAL 3 TIMES DAILY PRN
Qty: 20 CAPSULE | Refills: 0 | Status: SHIPPED | OUTPATIENT
Start: 2025-02-11

## 2025-02-11 NOTE — PROGRESS NOTES
[facility]        NAME: Junaid Mijares is a 34 y.o. male  : 1990    MRN: [id number]  DATE: 2025  TIME: 1:25 PM    Assessment and Plan   Upper respiratory virus [J06.9]  1. Upper respiratory virus  benzonatate (TESSALON) 200 MG capsule        Patient Instructions   Viral upper respiratory infection  Rx tessalon pearls sent via EMR  Recommend flonase nasal spray and sudafed for postnasal drip/cough  Warm salt water gargles  Rest, fluids and supportive care  May benefit from a cool mist humidifier on night stand  Tylenol/ibuprofen as needed for pain/fever    Follow up with PCP in 3-5 days.  Proceed to  ER if symptoms worsen.    If tests have been performed at [facility], our office will contact you with results if changes need to be made to the care plan discussed with you at the visit.  You can review your full results on [facility].    Chief Complaint     Chief Complaint   Patient presents with    Cold Like Symptoms     C/O cough, fever, chills, congestion, diarrhea, nauseas, headache and muscle ache. Took Tylenol.         History of Present Illness       Junaid is a 34-year-old male who presents to clinic complaining of flulike symptoms x 3 days.  He states he has had chills and fever since Saturday with a Tmax of 101 °F last night.  Also complaining of nasal congestion, rhinorrhea, diarrhea, cough, myalgias, and nausea.  He denies any vomiting, sore throat, ear pain, shortness of breath, loss of taste or smell, recent travel, or any known sick contacts.        Review of Systems   Review of Systems   Constitutional:  Positive for chills, fatigue and fever.   HENT:  Positive for congestion and rhinorrhea. Negative for ear pain and sore throat.    Respiratory:  Positive for cough. Negative for shortness of breath.    Gastrointestinal:  Positive for diarrhea and nausea. Negative for vomiting.   Musculoskeletal:  Positive for myalgias.   Neurological:  Negative for headaches.  "        Current Medications       Current Outpatient Medications:     benzonatate (TESSALON) 200 MG capsule, Take 1 capsule (200 mg total) by mouth 3 (three) times a day as needed for cough, Disp: 20 capsule, Rfl: 0    Easy Touch FlipLock Needles 18G X 1\" MISC, USE 1 NEEDLE INTRAMUSCULARLY ONCE A WEEK AS DIRECTED TO DRAW TESTISTERONE, Disp: , Rfl:     MONOJECT 3CC SYRINGE 3 ML MISC, Use as directed, Disp: , Rfl:     testosterone cypionate (DEPO-TESTOSTERONE) 200 mg/mL SOLN, INJECT 1 ML INTRAMUSCULARLY ONCE A WEEK AS DIRECTED, Disp: , Rfl:     Current Allergies     Allergies as of 02/11/2025    (No Known Allergies)            The following portions of the patient's history were reviewed and updated as appropriate: allergies, current medications, past family history, past medical history, past social history, past surgical history and problem list.     History reviewed. No pertinent past medical history.    Past Surgical History:   Procedure Laterality Date    FL INJECTION RIGHT SHOULDER (ARTHROGRAM)  10/14/2024       History reviewed. No pertinent family history.      Medications have been verified.        Objective   /74   Pulse 89   Temp 98.1 °F (36.7 °C)   Resp 18   Ht 5' 6\" (1.676 m)   Wt 69.9 kg (154 lb)   SpO2 98%   BMI 24.86 kg/m²   No LMP for male patient.       Physical Exam     Physical Exam  Vitals and nursing note reviewed.   Constitutional:       General: He is not in acute distress.     Appearance: Normal appearance. He is not ill-appearing.   HENT:      Right Ear: Tympanic membrane, ear canal and external ear normal.      Left Ear: Tympanic membrane, ear canal and external ear normal.      Nose: Congestion present.      Mouth/Throat:      Mouth: Mucous membranes are moist.      Pharynx: No oropharyngeal exudate or posterior oropharyngeal erythema.   Cardiovascular:      Rate and Rhythm: Normal rate and regular rhythm.      Heart sounds: Normal heart sounds.   Pulmonary:      Effort: " Pulmonary effort is normal. No respiratory distress.      Breath sounds: Normal breath sounds. No stridor. No wheezing, rhonchi or rales.   Lymphadenopathy:      Cervical: No cervical adenopathy.   Neurological:      Mental Status: He is alert and oriented to person, place, and time.   Psychiatric:         Mood and Affect: Mood normal.         Behavior: Behavior normal.